# Patient Record
Sex: FEMALE | Race: BLACK OR AFRICAN AMERICAN | Employment: FULL TIME | URBAN - METROPOLITAN AREA
[De-identification: names, ages, dates, MRNs, and addresses within clinical notes are randomized per-mention and may not be internally consistent; named-entity substitution may affect disease eponyms.]

---

## 2018-10-08 PROBLEM — R13.14 PHARYNGOESOPHAGEAL DYSPHAGIA: Chronic | Status: ACTIVE | Noted: 2018-10-08

## 2018-10-08 PROBLEM — K92.1 BLOOD IN STOOL: Status: ACTIVE | Noted: 2018-10-08

## 2018-11-07 ENCOUNTER — OFFICE VISIT (OUTPATIENT)
Dept: ONCOLOGY | Age: 53
End: 2018-11-07

## 2018-11-07 ENCOUNTER — HOSPITAL ENCOUNTER (OUTPATIENT)
Dept: ONCOLOGY | Age: 53
Discharge: HOME | End: 2018-11-07

## 2018-11-07 VITALS
RESPIRATION RATE: 20 BRPM | TEMPERATURE: 98.1 F | OXYGEN SATURATION: 100 % | HEART RATE: 81 BPM | HEIGHT: 66 IN | DIASTOLIC BLOOD PRESSURE: 72 MMHG | BODY MASS INDEX: 23.63 KG/M2 | SYSTOLIC BLOOD PRESSURE: 111 MMHG | WEIGHT: 147 LBS

## 2018-11-07 DIAGNOSIS — D50.8 IRON DEFICIENCY ANEMIA DUE TO DIETARY CAUSES: ICD-10-CM

## 2018-11-07 DIAGNOSIS — D50.8 IRON DEFICIENCY ANEMIA DUE TO DIETARY CAUSES: Primary | ICD-10-CM

## 2018-11-07 PROBLEM — N92.1 METRORRHAGIA: Status: ACTIVE | Noted: 2018-11-07

## 2018-11-07 LAB
BASO+EOS+MONOS # BLD AUTO: 0.4 K/UL (ref 0–2.3)
BASO+EOS+MONOS # BLD AUTO: 8 % (ref 0.1–17)
DIFFERENTIAL METHOD BLD: ABNORMAL
ERYTHROCYTE [DISTWIDTH] IN BLOOD BY AUTOMATED COUNT: 12.7 % (ref 11.5–14.5)
HCT VFR BLD AUTO: 32.9 % (ref 36–48)
HGB BLD-MCNC: 10.9 G/DL (ref 12–16)
LYMPHOCYTES # BLD: 2.7 K/UL (ref 1.1–5.9)
LYMPHOCYTES NFR BLD: 49 % (ref 14–44)
MCH RBC QN AUTO: 31.5 PG (ref 25–35)
MCHC RBC AUTO-ENTMCNC: 33.1 G/DL (ref 31–37)
MCV RBC AUTO: 95.1 FL (ref 78–102)
NEUTS SEG # BLD: 2.3 K/UL (ref 1.8–9.5)
NEUTS SEG NFR BLD: 43 % (ref 40–70)
PLATELET # BLD AUTO: 270 K/UL (ref 140–440)
RBC # BLD AUTO: 3.46 M/UL (ref 4.1–5.1)
WBC # BLD AUTO: 5.4 K/UL (ref 4.5–13)

## 2018-11-07 RX ORDER — MULTIVITAMIN
CAPSULE ORAL
Status: ON HOLD | COMMUNITY
End: 2019-10-21

## 2018-11-07 RX ORDER — ENOXAPARIN SODIUM 100 MG/ML
INJECTION SUBCUTANEOUS
Status: ON HOLD | COMMUNITY
End: 2019-10-21

## 2018-11-07 RX ORDER — METRONIDAZOLE 500 MG/1
TABLET ORAL
Status: ON HOLD | COMMUNITY
End: 2019-10-21

## 2018-11-07 RX ORDER — IBUPROFEN 400 MG/1
400 TABLET ORAL
Status: ON HOLD | COMMUNITY
End: 2019-10-21

## 2018-11-07 RX ORDER — ONDANSETRON 4 MG/1
TABLET, ORALLY DISINTEGRATING ORAL
Status: ON HOLD | COMMUNITY
Start: 2016-03-04 | End: 2019-10-21

## 2018-11-07 RX ORDER — SERTRALINE HYDROCHLORIDE 50 MG/1
TABLET, FILM COATED ORAL
Status: ON HOLD | COMMUNITY
End: 2019-10-21

## 2018-11-07 RX ORDER — AMITRIPTYLINE HYDROCHLORIDE 25 MG/1
TABLET, FILM COATED ORAL
Status: ON HOLD | COMMUNITY
End: 2019-10-21

## 2018-11-07 RX ORDER — BISACODYL 5 MG
TABLET, DELAYED RELEASE (ENTERIC COATED) ORAL
Refills: 0 | Status: ON HOLD | COMMUNITY
Start: 2018-10-03 | End: 2019-10-21

## 2018-11-07 RX ORDER — VENLAFAXINE 75 MG/1
TABLET ORAL
Refills: 5 | Status: ON HOLD | COMMUNITY
Start: 2018-10-23 | End: 2019-10-21

## 2018-11-07 RX ORDER — LOPERAMIDE HYDROCHLORIDE 2 MG/1
CAPSULE ORAL
Status: ON HOLD | COMMUNITY
End: 2019-10-21

## 2018-11-07 RX ORDER — CEFUROXIME AXETIL 250 MG/1
TABLET ORAL
Status: ON HOLD | COMMUNITY
End: 2019-10-21

## 2018-11-07 RX ORDER — POLYETHYLENE GLYCOL-3350 AND ELECTROLYTES 236; 6.74; 5.86; 2.97; 22.74 G/274.31G; G/274.31G; G/274.31G; G/274.31G; G/274.31G
POWDER, FOR SOLUTION ORAL
Refills: 0 | Status: ON HOLD | COMMUNITY
Start: 2018-10-03 | End: 2019-10-21

## 2018-11-07 RX ORDER — WARFARIN 2.5 MG/1
TABLET ORAL
Status: ON HOLD | COMMUNITY
End: 2019-10-21

## 2018-11-07 RX ORDER — POLYETHYLENE GLYCOL 3350, SODIUM CHLORIDE, POTASSIUM CHLORIDE, SODIUM BICARBONATE, AND SODIUM SULFATE 240; 5.84; 2.98; 6.72; 22.72 G/4L; G/4L; G/4L; G/4L; G/4L
POWDER, FOR SOLUTION ORAL
Status: ON HOLD | COMMUNITY
End: 2019-10-21

## 2018-11-07 RX ORDER — AZITHROMYCIN 250 MG/1
TABLET, FILM COATED ORAL
Refills: 0 | Status: ON HOLD | COMMUNITY
Start: 2018-10-23 | End: 2019-10-21

## 2018-11-07 RX ORDER — NITROFURANTOIN 25; 75 MG/1; MG/1
CAPSULE ORAL
Status: ON HOLD | COMMUNITY
End: 2019-10-21

## 2018-11-07 RX ORDER — AMOXICILLIN 875 MG/1
TABLET, FILM COATED ORAL
Status: ON HOLD | COMMUNITY
End: 2019-10-21

## 2018-11-07 RX ORDER — DABIGATRAN ETEXILATE 150 MG/1
CAPSULE ORAL
Status: ON HOLD | COMMUNITY
End: 2019-10-21

## 2018-11-07 RX ORDER — PREDNISONE 10 MG/1
TABLET ORAL
Status: ON HOLD | COMMUNITY
End: 2019-10-21

## 2018-11-07 RX ORDER — SERTRALINE HYDROCHLORIDE 50 MG/1
TABLET, FILM COATED ORAL
COMMUNITY
Start: 2018-11-02 | End: 2021-12-16

## 2018-11-07 RX ORDER — CHOLECALCIFEROL (VITAMIN D3) 50 MCG
CAPSULE ORAL
Status: ON HOLD | COMMUNITY
End: 2019-10-21

## 2018-11-07 RX ORDER — HYDROCODONE BITARTRATE AND IBUPROFEN 7.5; 2 MG/1; MG/1
TABLET, FILM COATED ORAL
Status: ON HOLD | COMMUNITY
End: 2019-10-21

## 2018-11-07 RX ORDER — IBUPROFEN AND FAMOTIDINE 26.6; 8 MG/1; MG/1
TABLET, FILM COATED ORAL
Status: ON HOLD | COMMUNITY
End: 2019-10-21

## 2018-11-07 RX ORDER — GENTAMICIN SULFATE 1 MG/G
CREAM TOPICAL
Status: ON HOLD | COMMUNITY
End: 2019-10-21

## 2018-11-07 RX ORDER — CIPROFLOXACIN HYDROCHLORIDE 3.5 MG/ML
SOLUTION/ DROPS TOPICAL
Status: ON HOLD | COMMUNITY
End: 2019-10-21

## 2018-11-07 RX ORDER — TOPIRAMATE 25 MG/1
TABLET ORAL
Status: ON HOLD | COMMUNITY
End: 2019-10-21

## 2018-11-07 RX ORDER — CEPHALEXIN 500 MG/1
CAPSULE ORAL
Status: ON HOLD | COMMUNITY
End: 2019-10-21

## 2018-11-07 RX ORDER — LOPERAMIDE HCL 2 MG
2 TABLET ORAL
Status: ON HOLD | COMMUNITY
Start: 2016-03-04 | End: 2019-10-21

## 2018-11-07 RX ORDER — ALBUTEROL SULFATE 90 UG/1
1-2 AEROSOL, METERED RESPIRATORY (INHALATION)
COMMUNITY
Start: 2016-03-04 | End: 2021-12-16

## 2018-11-07 RX ORDER — OXYCODONE AND ACETAMINOPHEN 10; 325 MG/1; MG/1
TABLET ORAL
Status: ON HOLD | COMMUNITY
End: 2019-10-21

## 2018-11-07 RX ORDER — GABAPENTIN 300 MG/1
CAPSULE ORAL
Status: ON HOLD | COMMUNITY
End: 2019-10-21

## 2018-11-07 RX ORDER — CIPROFLOXACIN 250 MG/1
TABLET, FILM COATED ORAL
Status: ON HOLD | COMMUNITY
End: 2019-10-21

## 2018-11-07 RX ORDER — HYOSCYAMINE SULFATE 0.12 MG/1
TABLET SUBLINGUAL
Status: ON HOLD | COMMUNITY
End: 2019-10-21

## 2018-11-07 RX ORDER — WARFARIN SODIUM 5 MG/1
TABLET ORAL
Status: ON HOLD | COMMUNITY
End: 2019-10-21

## 2018-11-07 NOTE — PATIENT INSTRUCTIONS
Iron Deficiency Anemia: Care Instructions  Your Care Instructions    Anemia means that you do not have enough red blood cells. Red blood cells carry oxygen around your body. When you have anemia, it can make you pale, weak, and tired. Many things can cause anemia. The most common cause is loss of blood. This can happen if you have heavy menstrual periods. It can also happen if you have bleeding in your stomach or bowel. You can also get anemia if you don't have enough iron in your diet or if it's hard for your body to absorb iron. In some cases, pregnancy causes anemia. That's because a pregnant woman needs more iron. Your doctor may do more tests to find the cause of your anemia. If a disease or other health problem is causing it, your doctor will treat that problem. It's important to follow up with your doctor to make sure that your iron level returns to normal.  Follow-up care is a key part of your treatment and safety. Be sure to make and go to all appointments, and call your doctor if you are having problems. It's also a good idea to know your test results and keep a list of the medicines you take. How can you care for yourself at home? · If your doctor recommended iron pills, take them as directed. ? Try to take the pills on an empty stomach. You can do this about 1 hour before or 2 hours after meals. But you may need to take iron with food to avoid an upset stomach. ? Do not take antacids or drink milk or anything with caffeine within 2 hours of when you take your iron. They can keep your body from absorbing the iron well. ? Vitamin C helps your body absorb iron. You may want to take iron pills with a glass of orange juice or some other food high in vitamin C.  ? Iron pills may cause stomach problems. These include heartburn, nausea, diarrhea, constipation, and cramps. It can help to drink plenty of fluids and include fruits, vegetables, and fiber in your diet.   ? It's normal for iron pills to make your stool a greenish or grayish black. But internal bleeding can also cause dark stool. So it's important to tell your doctor about any color changes. ? Call your doctor if you think you are having a problem with your iron pills. Even after you start to feel better, it will take several months for your body to build up its supply of iron. ? If you miss a pill, don't take a double dose. ? Keep iron pills out of the reach of small children. Too much iron can be very dangerous. · Eat foods with a lot of iron. These include red meat, shellfish, poultry, and eggs. They also include beans, raisins, whole-grain bread, and leafy green vegetables. · Steam your vegetables. This is the best way to prepare them if you want to get as much iron as possible. · Be safe with medicines. Do not take nonsteroidal anti-inflammatory pain relievers unless your doctor tells you to. These include aspirin, naproxen (Aleve), and ibuprofen (Advil, Motrin). · Liquid iron can stain your teeth. But you can mix it with water or juice and drink it with a straw. Then it won't get on your teeth. When should you call for help? Call 911 anytime you think you may need emergency care. For example, call if:    · You passed out (lost consciousness).    Call your doctor now or seek immediate medical care if:    · You are short of breath.     · You are dizzy or light-headed, or you feel like you may faint.     · You have new or worse bleeding.    Watch closely for changes in your health, and be sure to contact your doctor if:    · You feel weaker or more tired than usual.     · You do not get better as expected. Where can you learn more? Go to http://sugey-adeline.info/. Enter M893 in the search box to learn more about \"Iron Deficiency Anemia: Care Instructions. \"  Current as of: May 7, 2018  Content Version: 11.8  © 3016-7121 Healthwise, Incorporated.  Care instructions adapted under license by [employer] (which disclaims liability or warranty for this information). If you have questions about a medical condition or this instruction, always ask your healthcare professional. [address] any warranty or liability for your use of this information.

## 2018-11-07 NOTE — PROGRESS NOTES
Hematology/Oncology Consultation Note    Name: Joaquin Rosales  Date: 2018  : 1965    PCP: Juan Antonio Cross MD       Ms. Rosetta Sever  is a 48 y.o. -American woman who is referred for evaluation of anemia. Subjective:     Chief complaint: Anemia    History of present illness:  Ms. Rosetta Sever is a 59-year-old -American woman who states that she is in reasonably good health. However recent lab analysis from 10/23/2018 revealed that she has a mild degree of anemia with a hemoglobin of 11 g/dL hematocrit of 35%. RBC count was low at 3.56. However her MCV was elevated at 98 fmol/L. Her thyroid function appeared normal.  The vitamin B12 level was 481 pg/mL and the folate was 10 ng/mL. The patient is complaining of a mild degree of fatigue and some weakness but otherwise has no additional complaints. And iron profile showed that her iron level was 67 mcg/dL with an iron saturation of 22%. A ferritin level is not available for review. She denies having any noticeable gastrointestinal genitourinary blood loss. Past Medical History:   Diagnosis Date    Anemia NEC     Avulsion fracture of ankle     right lat malleolus    Diffuse cystic mastopathy     Herpes progenitalis     Thromboembolus ([facility] [address] 14    right leg    Vertigo 9-4-14.         Allergies   Allergen Reactions    Sulfa (Sulfonamide Antibiotics) Rash and Other (comments)     Intolerance       [place] Swelling    Lovenox [Enoxaparin] Other (comments)     Increased liver function       Past Surgical History:   Procedure Laterality Date    DELIVERY       DELIVERY   ,    HX GYN      uterine ablation    HX ORTHOPAEDIC      carpal tunnel of left wrist    HX SEPTOPLASTY         Social History     Socioeconomic History    Marital status:      Spouse name: Not on file    Number of children: Not on file    Years of education: Not on file    Highest education level: Not on file   Social Needs    Financial resource strain: Not on file    Food insecurity - worry: Not on file    Food insecurity - inability: Not on file    Transportation needs - medical: Not on file   Sifteo needs - non-medical: Not on file   Occupational History    Occupation: Mortician   Tobacco Use    Smoking status: Never Smoker    Smokeless tobacco: Never Used   Substance and Sexual Activity    Alcohol use: No    Drug use: No    Sexual activity: Yes     Partners: Male   Other Topics Concern    Not on file   Social History Narrative    Not on file       Family History   Problem Relation Age of Onset    High Cholesterol Mother     Thyroid Disease Mother        Current Outpatient Medications   Medication Sig Dispense Refill    albuterol (PROVENTIL HFA, VENTOLIN HFA, PROAIR HFA) 90 mcg/actuation inhaler 1-2 Puffs.  loperamide (IMMODIUM) 2 mg tablet 2 mg.       ondansetron (ZOFRAN ODT) 4 mg disintegrating tablet ondansetron 4 mg disintegrating tablet      multivitamin capsule multivitamin      Omeprazole Magnesium 20 mg cpDR omeprazole 20 mg capsule,delayed release      amitriptyline (ELAVIL) 25 mg tablet amitriptyline 25 mg tablet      amoxicillin (AMOXIL) 875 mg tablet amoxicillin 875 mg tablet      azithromycin (ZITHROMAX) 250 mg tablet USE AS DIRECTED  0    bisacodyl (DULCOLAX) 5 mg EC tablet TAKE 2 TABS 1 HR PRIOR TO EACH COLON PREP ORALLY 2 DAYS  0    cefUROXime (CEFTIN) 250 mg tablet cefuroxime axetil 250 mg tablet      cephALEXin (KEFLEX) 500 mg capsule cephalexin 500 mg capsule      ciprofloxacin HCl (CILOXAN) 0.3 % ophthalmic solution ciprofloxacin 0.3 % eye drops      ciprofloxacin HCl (CIPRO) 250 mg tablet ciprofloxacin 250 mg tablet      dabigatran etexilate (PRADAXA) 150 mg capsule Pradaxa 150 mg capsule      enoxaparin (LOVENOX) 60 mg/0.6 mL injection enoxaparin 60 mg/0.6 mL subcutaneous syringe      gabapentin (NEURONTIN) 300 mg capsule gabapentin 300 mg capsule      gentamicin (GARAMYCIN) 0.1 % topical cream gentamicin 0.1 % topical cream      HYDROcodone-ibuprofen (VICOPROFEN) 7.5-200 mg per tablet hydrocodone 7.5 mg-ibuprofen 200 mg tablet      hyoscyamine SL (LEVSIN/SL) 0.125 mg SL tablet hyoscyamine 0.125 mg sublingual tablet      ibuprofen (MOTRIN) 400 mg tablet 400 mg.  loperamide (IMODIUM) 2 mg capsule loperamide 2 mg capsule      metroNIDAZOLE (FLAGYL) 500 mg tablet metronidazole 500 mg tablet      nitrofurantoin, macrocrystal-monohydrate, (MACROBID) 100 mg capsule nitrofurantoin monohydrate/macrocrystals 100 mg capsule      oxyCODONE-acetaminophen (PERCOCET 10)  mg per tablet oxycodone-acetaminophen 10 mg-325 mg tablet      PEG 3350-Electrolytes (GAVILYTE-C) 240-22.72-6.72 -5.84 gram solution Gavilyte-C 240 gram-22.72 gram-6.72 gram-5.84 gram oral solution      GAVILYTE-G 236-22.74-6.74 -5.86 gram suspension TAKE AS DIRECTED FOR 2 DAYS  0    predniSONE (DELTASONE) 10 mg tablet prednisone 10 mg tablet      sertraline (ZOLOFT) 50 mg tablet sertraline 50 mg tablet   TAKE 2 TABLETS BY MOUTH EVERY DAY      sertraline (ZOLOFT) 50 mg tablet       topiramate (TOPAMAX) 25 mg tablet topiramate 25 mg tablet      venlafaxine (EFFEXOR) 75 mg tablet TAKE 1 TABLET BY MOUTH EVERY DAY  5    warfarin (COUMADIN) 2.5 mg tablet warfarin 2.5 mg tablet      warfarin (COUMADIN) 5 mg tablet warfarin 5 mg tablet      ibuprofen-famotidine 800-26.6 mg tab ibuprofen 800 mg tablet      ascorbic acid (VITAMIN C) 1,000 mg tablet Take 1 Tab by mouth daily. 30 Tab 5    MULTIVITAMINS,THERAPEUTIC (THERAGRAN PO) Take 1 Tab by mouth daily. Review of Systems    General ROS:The patient has complaints of a mild degree of fatigue and some weakness. Psychological ROS: patient denies having any psychological symptoms such as hallucinations, depression or anxiety. Ophthalmic ROS:the patient denies having any visual impairment or eye discomfort.   ENT ROS: there are no abnormalities reported. Allergy and Immunology ROS:the patient denies having any seasonal allergies or allergies to medications other than those already outlined above. Hematological and Lymphatic ROS: the patient denies having any bruising, bleeding or lymphadenopathy. Endocrine ROS: the patient denies having any heat or cold intolerance. There is no history of diabetes or thyroid disorders. Breast ROS: the patient denies having any history of breast mass, nipple discharge, or lumps. Respiratory ROS:the patient denies having any cough, shortness of breath, or dyspnea on exertion. Cardiovascular ROS: there are no complaints of chest pain, palpitations, chest pounding, or dyspnea on exertion. Gastrointestinal ROS: the patient denies having nausea, emesis, diarrhea, constipation, or blood in the stool. Genito-Urinary ROS: the patient denies having urinary urgency, frequency, or dysuria. Musculoskeletal ROS: with the exception of mild arthralgias the patient has no other musculoskeletal complaints. Neurological ROS: the patient denies having any numbness, tingling, or neurologic deficits. Dermatological ROS:patient denies having any unexplained rash, skin ulcerations, or hives. Objective:     Visit Vitals  /72 (BP 1 Location: Right arm, BP Patient Position: Sitting)   Pulse 81   Temp 98.1 °F (36.7 °C) (Oral)   Resp 20   Ht 5' 6\" (1.676 m)   Wt 66.7 kg (147 lb)   SpO2 100%   BMI 23.73 kg/m²        ECOGPS=0; pain score=0/10    Physical Exam:   Gen. Appearance: the patient is in no acute distress. Skin: There is no evidence of bruise or rash. HEENT: The head is normocephalic and atraumatic. The conjunctiva and sclera are clear. Pupils are equal, round, reactive to light, and accommodation. The extraocular movements are intact. ENT reveals no oral mucosal lesions or ulcerations. Neck: Supple without lymphadenopathy or thyromegaly.   Lungs: Clear to auscultation and percussion; there are no wheezes or rhonchi. Heart: Regular rate and rhythm; there are no murmurs, gallops, or rubs. Abdomen: Bowel sounds are present and normal.  There is no guarding, tenderness, or hepatosplenomegaly. Extremities: There is no clubbing, cyanosis, or edema. Neurologic: There are no focal neurologic deficits. Lymphatics: There is no palpable peripheral lymphadenopathy. Lab data:    A CBC dated 10/23/2018 shows WBC count of 5.6, RBC 3.56, hemoglobin 11 g/dL, hematocrit 35%, MCV 98, and the platelet count was 060,397. And iron profile showed an iron level of 67 mcg/dL and iron saturation of 22%. The vitamin B12 level was 481 pg/mL and the folate was 10.09 ng/mL. A comprehensive metabolic panel revealed all normal values except for a slightly elevated glucose of 110 mg/dL. Assessment:   Chronic anemia: Have explained to the patient that she apparently has a functional iron deficiency. Unfortunately I do not have a ferritin level to gauge whether or not she has a true iron deficiency per se. The iron profile revealed a circulating serum iron of 67 mcg/dL with an iron saturation of 22%. Diagnostic considerations at this time will include slowly progressive functional iron deficiency or slowly evolving plasma cell dyscrasia. Based on her age myelodysplastic syndrome is less likely. Plan:   Chronic anemia: At this time I will order a competency metabolic panel, ferritin level, and comprehensive metabolic panel. Additionally the SPEP will be ordered to have rule out any evidence of a slowly evolving plasma cell dyscrasia. If the only finding is a functional iron deficit then she will be offered iron supplementation depending on the degree of ferritin deficit. Follow-up in 2 weeks to review lab data and to discuss potential options of management.     Orders Placed This Encounter    METABOLIC PANEL, COMPREHENSIVE     Standing Status:   Future     Number of Occurrences:   1     Standing Expiration Date:   2019    FERRITIN     Standing Status:   Future     Number of Occurrences:   1     Standing Expiration Date:   2019    IRON PROFILE     Standing Status:   Future     Number of Occurrences:   1     Standing Expiration Date:   2019    VITAMIN B12 & FOLATE     Standing Status:   Future     Number of Occurrences:   1     Standing Expiration Date:   2019    SPEP     Standing Status:   Future     Number of Occurrences:   1     Standing Expiration Date:   2019    multivitamin capsule     Sig: multivitamin    Omeprazole Magnesium 20 mg cpDR     Sig: omeprazole 20 mg capsule,delayed release    albuterol (PROVENTIL HFA, VENTOLIN HFA, PROAIR HFA) 90 mcg/actuation inhaler     Si-2 Puffs.     amitriptyline (ELAVIL) 25 mg tablet     Sig: amitriptyline 25 mg tablet    amoxicillin (AMOXIL) 875 mg tablet     Sig: amoxicillin 875 mg tablet    azithromycin (ZITHROMAX) 250 mg tablet     Sig: USE AS DIRECTED     Refill:  0    bisacodyl (DULCOLAX) 5 mg EC tablet     Sig: TAKE 2 TABS 1 HR PRIOR TO EACH COLON PREP ORALLY 2 DAYS     Refill:  0    cefUROXime (CEFTIN) 250 mg tablet     Sig: cefuroxime axetil 250 mg tablet    cephALEXin (KEFLEX) 500 mg capsule     Sig: cephalexin 500 mg capsule    ciprofloxacin HCl (CILOXAN) 0.3 % ophthalmic solution     Sig: ciprofloxacin 0.3 % eye drops    ciprofloxacin HCl (CIPRO) 250 mg tablet     Sig: ciprofloxacin 250 mg tablet    dabigatran etexilate (PRADAXA) 150 mg capsule     Sig: Pradaxa 150 mg capsule    enoxaparin (LOVENOX) 60 mg/0.6 mL injection     Sig: enoxaparin 60 mg/0.6 mL subcutaneous syringe    gabapentin (NEURONTIN) 300 mg capsule     Sig: gabapentin 300 mg capsule    gentamicin (GARAMYCIN) 0.1 % topical cream     Sig: gentamicin 0.1 % topical cream    HYDROcodone-ibuprofen (VICOPROFEN) 7.5-200 mg per tablet     Sig: hydrocodone 7.5 mg-ibuprofen 200 mg tablet    hyoscyamine SL (LEVSIN/SL) 0.125 mg SL tablet     Sig: hyoscyamine 0.125 mg sublingual tablet    ibuprofen (MOTRIN) 400 mg tablet     Si mg.  loperamide (IMODIUM) 2 mg capsule     Sig: loperamide 2 mg capsule    loperamide (IMMODIUM) 2 mg tablet     Si mg.  metroNIDAZOLE (FLAGYL) 500 mg tablet     Sig: metronidazole 500 mg tablet    nitrofurantoin, macrocrystal-monohydrate, (MACROBID) 100 mg capsule     Sig: nitrofurantoin monohydrate/macrocrystals 100 mg capsule    ondansetron (ZOFRAN ODT) 4 mg disintegrating tablet     Sig: ondansetron 4 mg disintegrating tablet    oxyCODONE-acetaminophen (PERCOCET 10)  mg per tablet     Sig: oxycodone-acetaminophen 10 mg-325 mg tablet    PEG 3350-Electrolytes (GAVILYTE-C) 240-22.72-6.72 -5.84 gram solution     Sig: Gavilyte-C 240 gram-22.72 gram-6.72 gram-5.84 gram oral solution    GAVILYTE-G 236-22.74-6.74 -5.86 gram suspension     Sig: TAKE AS DIRECTED FOR 2 DAYS     Refill:  0    predniSONE (DELTASONE) 10 mg tablet     Sig: prednisone 10 mg tablet    sertraline (ZOLOFT) 50 mg tablet     Sig: sertraline 50 mg tablet   TAKE 2 TABLETS BY MOUTH EVERY DAY    sertraline (ZOLOFT) 50 mg tablet    topiramate (TOPAMAX) 25 mg tablet     Sig: topiramate 25 mg tablet    venlafaxine (EFFEXOR) 75 mg tablet     Sig: TAKE 1 TABLET BY MOUTH EVERY DAY     Refill:  5    warfarin (COUMADIN) 2.5 mg tablet     Sig: warfarin 2.5 mg tablet    warfarin (COUMADIN) 5 mg tablet     Sig: warfarin 5 mg tablet    ibuprofen-famotidine 800-26.6 mg tab     Sig: ibuprofen 800 mg tablet           Jose Young MD  2018      Please note: This document has been produced using voice recognition software. Unrecognized errors in transcription may be present.

## 2018-11-08 LAB
A-G RATIO,AGRAT: 2 RATIO (ref 1.1–2.6)
ALBUMIN SERPL-MCNC: 4.8 G/DL (ref 3.5–5)
ALBUMIN, 001488: 58.4 % (ref 46.6–62.6)
ALP SERPL-CCNC: 93 U/L (ref 25–115)
ALPHA-1-GLOBULIN, 001489: 3.2 % (ref 1.7–4.1)
ALPHA-2-GLOBULIN, 001490: 10.6 % (ref 5.9–13.5)
ALT SERPL-CCNC: 15 U/L (ref 5–40)
ANION GAP SERPL CALC-SCNC: 15 MMOL/L
AST SERPL W P-5'-P-CCNC: 15 U/L (ref 10–37)
BETA GLOBULIN, 001491: 16.6 % (ref 10.9–18.9)
BILIRUB SERPL-MCNC: 0.2 MG/DL (ref 0.2–1.2)
BUN SERPL-MCNC: 17 MG/DL (ref 6–22)
CALCIUM SERPL-MCNC: 10 MG/DL (ref 8.4–10.5)
CHLORIDE SERPL-SCNC: 103 MMOL/L (ref 98–110)
CO2 SERPL-SCNC: 23 MMOL/L (ref 20–32)
CREAT SERPL-MCNC: 0.7 MG/DL (ref 0.5–1.2)
FE % SATURATION,PSAT: 25 % (ref 20–50)
FERRITIN SERPL-MCNC: 68 NG/ML (ref 10–291)
FOLATE,FOL: 8.38 NG/ML
GAMMA GLOBULIN, 001492: 11.2 % (ref 11.6–24.4)
GFRAA, 66117: >60
GFRNA, 66118: >60
GLOBULIN,GLOB: 2.4 G/DL (ref 2–4)
GLUCOSE SERPL-MCNC: 85 MG/DL (ref 70–99)
IRON,IRN: 77 MCG/DL (ref 30–160)
PE INTERPRETATION, 107352: ABNORMAL
POTASSIUM SERPL-SCNC: 4 MMOL/L (ref 3.5–5.5)
PROT SERPL-MCNC: 6.8 G/DL (ref 6.4–8.3)
PROT SERPL-MCNC: 7.2 G/DL (ref 6.4–8.3)
SODIUM SERPL-SCNC: 141 MMOL/L (ref 133–145)
TIBC,TIBC: 310 MCG/DL (ref 228–428)
UIBC SERPL-MCNC: 233 MCG/DL (ref 110–370)
VIT B12 SERPL-MCNC: 481 PG/ML (ref 211–911)

## 2018-11-29 ENCOUNTER — OFFICE VISIT (OUTPATIENT)
Dept: ONCOLOGY | Age: 53
End: 2018-11-29

## 2018-11-29 VITALS
HEIGHT: 66 IN | RESPIRATION RATE: 16 BRPM | BODY MASS INDEX: 24.75 KG/M2 | HEART RATE: 96 BPM | OXYGEN SATURATION: 100 % | TEMPERATURE: 97.1 F | SYSTOLIC BLOOD PRESSURE: 94 MMHG | WEIGHT: 154 LBS | DIASTOLIC BLOOD PRESSURE: 50 MMHG

## 2018-11-29 DIAGNOSIS — N92.1 METRORRHAGIA: Primary | ICD-10-CM

## 2018-11-29 NOTE — PATIENT INSTRUCTIONS
Anemia From Chronic Disease: Care Instructions Your Care Instructions Anemia is a low level of red blood cells. Red blood cells carry oxygen from your lungs to the rest of your body. Sometimes when you have a long-term (chronic) disease, such as kidney disease, arthritis, diabetes, cancer, or an infection, your body does not make enough red blood cells. Follow-up care is a key part of your treatment and safety. Be sure to make and go to all appointments, and call your doctor if you are having problems. It's also a good idea to know your test results and keep a list of the medicines you take. How can you care for yourself at home? · Follow your doctor's instructions to treat the chronic condition that is causing the anemia. · Be safe with medicines. Take your medicine to treat your chronic condition exactly as prescribed. Call your doctor if you think you are having a problem with your medicine. · Take your medicine for anemia exactly as prescribed. Call your doctor if you think you are having a problem with your medicine. Medicines to increase the number of red blood cells (such as epoetin or darbepoetin) may be given as an injection. ? If you miss a dose, take it as soon as you can, unless it is almost time for your next dose. In that case, get back on your regular schedule and take only one dose. ? Do not freeze this medicine. Store it in the refrigerator. Do not shake the bottle before you prepare the shot. · Keep all your appointments for blood tests to check on your hemoglobin levels. When should you call for help? Call 911 anytime you think you may need emergency care. For example, call if: 
  · You passed out (lost consciousness).  
 Call your doctor now or seek immediate medical care if: 
  · You are short of breath.  
  · You are dizzy or light-headed, or you feel like you may faint.  
  · You have new or worse bleeding.  Watch closely for changes in your health, and be sure to contact your doctor if: 
  · You feel weaker or more tired than usual.  
  · You do not get better as expected. Where can you learn more? Go to http://sugey-adeline.info/. Enter E502 in the search box to learn more about \"Anemia From Chronic Disease: Care Instructions. \" Current as of: May 7, 2018 Content Version: 11.8 © 2860-6977 [employer]wise, Incorporated. Care instructions adapted under license by [employer] (which disclaims liability or warranty for this information). If you have questions about a medical condition or this instruction, always ask your healthcare professional. [address] any warranty or liability for your use of this information.

## 2018-11-29 NOTE — PROGRESS NOTES
Hematology/medical oncology progress note 11/29/2018 Leda Thakkar YOB: 1965 PCP: Dr. Nery Shi Diagnosis: Anemia of multifactorial etiology Ms. Randee Styles is a 59-year-old woman who was referred for an evaluation of anemia. I have explained to the patient that her CBC from 11/7/2018 revealed a WBC count of 5.4, hemoglobin 10.9 g/dL, hematocrit 32.9, and a platelet count of 776,878. A serum protein electrophoresis showed no evidence of a monoclonal paraprotein. Her B12 level was normal at 481 pg/mL. The folate was normal at 8.38 ng/mL. Iron studies revealed an iron level of 77 mcg/dL with an iron saturation of 25%. Ferritin level was 68 ng/mL. The comprehensive metabolic panel from 01/1/4600 showed all normal values. I have explained to the patient that she does not have and her liver function and kidney function are normal.  She does have a mild functional anemia since her ferritin level is below 80 ng/mL. At this time I am recommending that she began taking ferrous sulfate 1 tablet daily. I will recheck her hemoglobin hematocrit again in about 6 weeks. If there is no significant change or if the hemoglobin is continuing to decline she will then need to undergo a bone marrow biopsy to assess for any evidence that she may be developing early myelodysplastic syndrome. The patient had her questions answered to her satisfaction. Total time 30 minutes, greater than 50% of the time was in counseling and coordination of care. Satinder Dove MD, [place]

## 2019-01-10 ENCOUNTER — HOSPITAL ENCOUNTER (OUTPATIENT)
Dept: ONCOLOGY | Age: 54
Discharge: HOME | End: 2019-01-10

## 2019-01-10 ENCOUNTER — OFFICE VISIT (OUTPATIENT)
Dept: ONCOLOGY | Age: 54
End: 2019-01-10

## 2019-01-10 VITALS
SYSTOLIC BLOOD PRESSURE: 111 MMHG | RESPIRATION RATE: 16 BRPM | BODY MASS INDEX: 24.27 KG/M2 | DIASTOLIC BLOOD PRESSURE: 73 MMHG | TEMPERATURE: 97 F | WEIGHT: 151 LBS | OXYGEN SATURATION: 100 % | HEART RATE: 92 BPM | HEIGHT: 66 IN

## 2019-01-10 DIAGNOSIS — D50.8 IRON DEFICIENCY ANEMIA DUE TO DIETARY CAUSES: ICD-10-CM

## 2019-01-10 DIAGNOSIS — D50.8 IRON DEFICIENCY ANEMIA DUE TO DIETARY CAUSES: Primary | ICD-10-CM

## 2019-01-10 LAB
BASO+EOS+MONOS # BLD AUTO: 0.5 K/UL (ref 0–2.3)
BASO+EOS+MONOS NFR BLD AUTO: 10 % (ref 0.1–17)
DIFFERENTIAL METHOD BLD: ABNORMAL
ERYTHROCYTE [DISTWIDTH] IN BLOOD BY AUTOMATED COUNT: 12.4 % (ref 11.5–14.5)
HCT VFR BLD AUTO: 33.8 % (ref 36–48)
HGB BLD-MCNC: 11.2 G/DL (ref 12–16)
LYMPHOCYTES # BLD: 2.1 K/UL (ref 1.1–5.9)
LYMPHOCYTES NFR BLD: 44 % (ref 14–44)
MCH RBC QN AUTO: 31.6 PG (ref 25–35)
MCHC RBC AUTO-ENTMCNC: 33.1 G/DL (ref 31–37)
MCV RBC AUTO: 95.5 FL (ref 78–102)
NEUTS SEG # BLD: 2.3 K/UL (ref 1.8–9.5)
NEUTS SEG NFR BLD: 47 % (ref 40–70)
PLATELET # BLD AUTO: 268 K/UL (ref 140–440)
RBC # BLD AUTO: 3.54 M/UL (ref 4.1–5.1)
WBC # BLD AUTO: 4.9 K/UL (ref 4.5–13)

## 2019-01-10 NOTE — PATIENT INSTRUCTIONS
Iron Deficiency Anemia: Care Instructions Your Care Instructions Anemia means that you do not have enough red blood cells. Red blood cells carry oxygen around your body. When you have anemia, it can make you pale, weak, and tired. Many things can cause anemia. The most common cause is loss of blood. This can happen if you have heavy menstrual periods. It can also happen if you have bleeding in your stomach or bowel. You can also get anemia if you don't have enough iron in your diet or if it's hard for your body to absorb iron. In some cases, pregnancy causes anemia. That's because a pregnant woman needs more iron. Your doctor may do more tests to find the cause of your anemia. If a disease or other health problem is causing it, your doctor will treat that problem. It's important to follow up with your doctor to make sure that your iron level returns to normal. 
Follow-up care is a key part of your treatment and safety. Be sure to make and go to all appointments, and call your doctor if you are having problems. It's also a good idea to know your test results and keep a list of the medicines you take. How can you care for yourself at home? · If your doctor recommended iron pills, take them as directed. ? Try to take the pills on an empty stomach. You can do this about 1 hour before or 2 hours after meals. But you may need to take iron with food to avoid an upset stomach. ? Do not take antacids or drink milk or anything with caffeine within 2 hours of when you take your iron. They can keep your body from absorbing the iron well. ? Vitamin C helps your body absorb iron. You may want to take iron pills with a glass of orange juice or some other food high in vitamin C. 
? Iron pills may cause stomach problems. These include heartburn, nausea, diarrhea, constipation, and cramps. It can help to drink plenty of fluids and include fruits, vegetables, and fiber in your diet. ? It's normal for iron pills to make your stool a greenish or grayish black. But internal bleeding can also cause dark stool. So it's important to tell your doctor about any color changes. ? Call your doctor if you think you are having a problem with your iron pills. Even after you start to feel better, it will take several months for your body to build up its supply of iron. ? If you miss a pill, don't take a double dose. ? Keep iron pills out of the reach of small children. Too much iron can be very dangerous. · Eat foods with a lot of iron. These include red meat, shellfish, poultry, and eggs. They also include beans, raisins, whole-grain bread, and leafy green vegetables. · Steam your vegetables. This is the best way to prepare them if you want to get as much iron as possible. · Be safe with medicines. Do not take nonsteroidal anti-inflammatory pain relievers unless your doctor tells you to. These include aspirin, naproxen (Aleve), and ibuprofen (Advil, Motrin). · Liquid iron can stain your teeth. But you can mix it with water or juice and drink it with a straw. Then it won't get on your teeth. When should you call for help? Call 911 anytime you think you may need emergency care. For example, call if: 
  · You passed out (lost consciousness).  
 Call your doctor now or seek immediate medical care if: 
  · You are short of breath.  
  · You are dizzy or light-headed, or you feel like you may faint.  
  · You have new or worse bleeding.  
 Watch closely for changes in your health, and be sure to contact your doctor if: 
  · You feel weaker or more tired than usual.  
  · You do not get better as expected. Where can you learn more? Go to http://sugey-adeline.info/. Enter I707 in the search box to learn more about \"Iron Deficiency Anemia: Care Instructions. \" Current as of: May 7, 2018 Content Version: 11.8 © 3536-3169 [employer].  Care instructions adapted under license by [address] (which disclaims liability or warranty for this information). If you have questions about a medical condition or this instruction, always ask your healthcare professional. [address] any warranty or liability for your use of this information.

## 2019-01-10 NOTE — PROGRESS NOTES
Hematology/Oncology  Progress Note Name: Alex Willard 
Date: 1/10/2019 : 1965 PCP: Sourav Mcdowell MD  
 
Ms. Molina Reyes is a 48 y.o. -American woman with functional iron deficiency. Current therapy Slow Fe 1 tablet daily. Subjective: Ms. Molina Reyes is a 66-year-old  functional iron deficiency anemia. At her last clinic visit I recommended that she began taking Slow Fe 1 tablet daily. The goal was to slowly raise her hemoglobin and ferritin level. At her last clinic visit her hemoglobin was 10.9 g/dL with hematocrit of 32.9. She is tolerating oral iron therapy reasonably well and has no complaints of constipation or abdominal cramps. Past medical history, family history, and social history: these were reviewed and remains unchanged. Past Medical History:  
Diagnosis Date  Anemia NEC  Avulsion fracture of ankle   
 right lat malleolus  Diffuse cystic mastopathy  Herpes progenitalis  Thromboembolus ([facility] [address] 14  
 right leg  Vertigo 9-4-14. Past Surgical History:  
Procedure Laterality Date  DELIVERY     DELIVERY   8445,0515  HX GYN    
 uterine ablation  HX ORTHOPAEDIC    
 carpal tunnel of left wrist  
 HX SEPTOPLASTY Social History Socioeconomic History  Marital status:  Spouse name: Not on file  Number of children: Not on file  Years of education: Not on file  Highest education level: Not on file Social Needs  Financial resource strain: Not on file  Food insecurity - worry: Not on file  Food insecurity - inability: Not on file  Transportation needs - medical: Not on file  Transportation needs - non-medical: Not on file Occupational History  Occupation: Mortician Tobacco Use  Smoking status: Never Smoker  Smokeless tobacco: Never Used Substance and Sexual Activity  Alcohol use: No  
 Drug use: No  
 Sexual activity: Yes  
 Partners: Male Other Topics Concern  Not on file Social History Narrative  Not on file Family History Problem Relation Age of Onset  High Cholesterol Mother  Thyroid Disease Mother Current Outpatient Medications Medication Sig Dispense Refill  multivitamin capsule multivitamin  Omeprazole Magnesium 20 mg cpDR omeprazole 20 mg capsule,delayed release  albuterol (PROVENTIL HFA, VENTOLIN HFA, PROAIR HFA) 90 mcg/actuation inhaler 1-2 Puffs.  amitriptyline (ELAVIL) 25 mg tablet amitriptyline 25 mg tablet  amoxicillin (AMOXIL) 875 mg tablet amoxicillin 875 mg tablet  azithromycin (ZITHROMAX) 250 mg tablet USE AS DIRECTED  0  
 bisacodyl (DULCOLAX) 5 mg EC tablet TAKE 2 TABS 1 HR PRIOR TO EACH COLON PREP ORALLY 2 DAYS  0  
 cefUROXime (CEFTIN) 250 mg tablet cefuroxime axetil 250 mg tablet  cephALEXin (KEFLEX) 500 mg capsule cephalexin 500 mg capsule  ciprofloxacin HCl (CILOXAN) 0.3 % ophthalmic solution ciprofloxacin 0.3 % eye drops  ciprofloxacin HCl (CIPRO) 250 mg tablet ciprofloxacin 250 mg tablet  dabigatran etexilate (PRADAXA) 150 mg capsule Pradaxa 150 mg capsule  enoxaparin (LOVENOX) 60 mg/0.6 mL injection enoxaparin 60 mg/0.6 mL subcutaneous syringe  gabapentin (NEURONTIN) 300 mg capsule gabapentin 300 mg capsule  gentamicin (GARAMYCIN) 0.1 % topical cream gentamicin 0.1 % topical cream    
 HYDROcodone-ibuprofen (VICOPROFEN) 7.5-200 mg per tablet hydrocodone 7.5 mg-ibuprofen 200 mg tablet  hyoscyamine SL (LEVSIN/SL) 0.125 mg SL tablet hyoscyamine 0.125 mg sublingual tablet  ibuprofen (MOTRIN) 400 mg tablet 400 mg.  loperamide (IMODIUM) 2 mg capsule loperamide 2 mg capsule  loperamide (IMMODIUM) 2 mg tablet 2 mg.  metroNIDAZOLE (FLAGYL) 500 mg tablet metronidazole 500 mg tablet  nitrofurantoin, macrocrystal-monohydrate, (MACROBID) 100 mg capsule nitrofurantoin monohydrate/macrocrystals 100 mg capsule  ondansetron (ZOFRAN ODT) 4 mg disintegrating tablet ondansetron 4 mg disintegrating tablet  oxyCODONE-acetaminophen (PERCOCET 10)  mg per tablet oxycodone-acetaminophen 10 mg-325 mg tablet  PEG 3350-Electrolytes (GAVILYTE-C) 240-22.72-6.72 -5.84 gram solution Gavilyte-C 240 gram-22.72 gram-6.72 gram-5.84 gram oral solution  GAVILYTE-G 236-22.74-6.74 -5.86 gram suspension TAKE AS DIRECTED FOR 2 DAYS  0  
 predniSONE (DELTASONE) 10 mg tablet prednisone 10 mg tablet  sertraline (ZOLOFT) 50 mg tablet sertraline 50 mg tablet TAKE 2 TABLETS BY MOUTH EVERY DAY  sertraline (ZOLOFT) 50 mg tablet  topiramate (TOPAMAX) 25 mg tablet topiramate 25 mg tablet  venlafaxine (EFFEXOR) 75 mg tablet TAKE 1 TABLET BY MOUTH EVERY DAY  5  
 warfarin (COUMADIN) 2.5 mg tablet warfarin 2.5 mg tablet  warfarin (COUMADIN) 5 mg tablet warfarin 5 mg tablet  ibuprofen-famotidine 800-26.6 mg tab ibuprofen 800 mg tablet  ascorbic acid (VITAMIN C) 1,000 mg tablet Take 1 Tab by mouth daily. 30 Tab 5  MULTIVITAMINS,THERAPEUTIC (THERAGRAN PO) Take 1 Tab by mouth daily. Review of Systems Constitutional: The patient has no acute distress or discomfort. HEENT: The patient denies recent head trauma, eye pain, blurred vision,  hearing deficit, oropharyngeal mucosal pain or lesions, and the patient denies throat pain or discomfort. Lymphatics: The patient denies palpable peripheral lymphadenopathy. Hematologic: The patient denies having bruising, bleeding, or progressive fatigue. Respiratory: Patient denies having shortness of breath, cough, sputum production, fever, or dyspnea on exertion. Cardiovascular: The patient denies having leg pain, leg swelling, heart palpitations, chest permit, chest pain, or lightheadedness. The patient denies having dyspnea on exertion. Gastrointestinal: The patient denies having nausea, emesis, or diarrhea. The patient denies having any hematemesis or blood in the stool. Genitourinary: Patient denies having urinary urgency, frequency, or dysuria. The patient denies having blood in the urine. Psychological: The patient denies having symptoms of nervousness, anxiety, depression, or thoughts of harming self. Skin: Patient denies having skin rashes, skin, ulcerations, or unexplained itching or pruritus. Musculoskeletal: The patient denies having pain in the joints or bones. Objective:  
 
Visit Vitals /73 Pulse 92 Temp 97 °F (36.1 °C) (Oral) Resp 16 Ht 5' 6\" (1.676 m) Wt 68.5 kg (151 lb) SpO2 100% BMI 24.37 kg/m² ECOG PS=0; Pain score=0/10 Physical Exam:  
Gen. Appearance: The patient is in no acute distress. Skin: There is no bruise or rash. HEENT: The exam is unremarkable. Neck: Supple without lymphadenopathy or thyromegaly. Lungs: Clear to auscultation and percussion; there are no wheezes or rhonchi. Heart: Regular rate and rhythm; there are no murmurs, gallops, or rubs. Abdomen: Bowel sounds are present and normal.  There is no guarding, tenderness, or hepatosplenomegaly. Extremities: There is no clubbing, cyanosis, or edema. Neurologic: There are no focal neurologic deficits. Lymphatics: There is no palpable peripheral lymphadenopathy. Musculoskeletal: The patient has full range of motion at all joints. There is no evidence of joint deformity or effusions. There is no focal joint tenderness. Psychological/psychiatric: There is no clinical evidence of anxiety, depression, or melancholy. Lab data: 
   
Results for orders placed or performed during the hospital encounter of 01/10/19 CBC WITH 3 PART DIFF     Status: Abnormal  
Result Value Ref Range Status  WBC 4.9 4.5 - 13.0 K/uL Final  
 RBC 3.54 (L) 4.10 - 5.10 M/uL Final  
 HGB 11.2 (L) 12.0 - 16.0 g/dL Final  
 HCT 33.8 (L) 36 - 48 % Final  
 MCV 95.5 78 - 102 FL Final  
 MCH 31.6 25.0 - 35.0 PG Final  
 MCHC 33.1 31 - 37 g/dL Final  
 RDW 12.4 11.5 - 14.5 % Final  
 PLATELET 761 151 - 524 K/uL Final  
 NEUTROPHILS 47 40 - 70 % Final  
 MIXED CELLS 10 0.1 - 17 % Final  
 LYMPHOCYTES 44 14 - 44 % Final  
 ABS. NEUTROPHILS 2.3 1.8 - 9.5 K/UL Final  
 ABS. MIXED CELLS 0.5 0.0 - 2.3 K/uL Final  
 ABS. LYMPHOCYTES 2.1 1.1 - 5.9 K/UL Final  
  Comment: Test performed at [place] [address] Location. Results Reviewed by Medical Director. DF AUTOMATED   Final  
 
 
   
Assessment: 1. Iron deficiency anemia due to dietary causes Plan:  
Functional iron deficiency anemia: Have explained to the patient that the CBC for from today shows some improvement in her hemoglobin to 11.2 g/dL with hematocrit of 33.8%. The WBC is normal at 4.9 and the platelet count is 944,284. I will check a ferritin level again at this time. In addition to continuing the oral iron therapy I recommended that she began taking Centrum Silver 1 tablet daily as well. The patient had her questions answered to her satisfaction. I will see her back in clinic in 8 weeks. Follow-up in 8 weeks Orders Placed This Encounter  COMPLETE CBC & AUTO DIFF WBC  InHouse CBC ([employer]) Standing Status:   Future Number of Occurrences:   1 Standing Expiration Date:   1/17/2019  METABOLIC PANEL, COMPREHENSIVE Standing Status:   Future Number of Occurrences:   1 Standing Expiration Date:   1/11/2020  
 IRON PROFILE Standing Status:   Future Number of Occurrences:   1 Standing Expiration Date:   1/11/2020  FERRITIN Standing Status:   Future Number of Occurrences:   1 Standing Expiration Date:   1/11/2020 Farzana Cosby MD 
1/10/2019 Please note: This document has been produced using voice recognition software. Unrecognized errors in transcription may be present.

## 2019-01-11 LAB
A-G RATIO,AGRAT: 2 RATIO (ref 1.1–2.6)
ALBUMIN SERPL-MCNC: 4.5 G/DL (ref 3.5–5)
ALP SERPL-CCNC: 79 U/L (ref 25–115)
ALT SERPL-CCNC: 12 U/L (ref 5–40)
ANION GAP SERPL CALC-SCNC: 12 MMOL/L
AST SERPL W P-5'-P-CCNC: 13 U/L (ref 10–37)
BILIRUB SERPL-MCNC: 0.3 MG/DL (ref 0.2–1.2)
BUN SERPL-MCNC: 13 MG/DL (ref 6–22)
CALCIUM SERPL-MCNC: 9.6 MG/DL (ref 8.4–10.5)
CHLORIDE SERPL-SCNC: 106 MMOL/L (ref 98–110)
CO2 SERPL-SCNC: 27 MMOL/L (ref 20–32)
CREAT SERPL-MCNC: 0.7 MG/DL (ref 0.5–1.2)
FE % SATURATION,PSAT: 30 % (ref 20–50)
FERRITIN SERPL-MCNC: 69 NG/ML (ref 10–291)
GFRAA, 66117: >60
GFRNA, 66118: >60
GLOBULIN,GLOB: 2.2 G/DL (ref 2–4)
GLUCOSE SERPL-MCNC: 101 MG/DL (ref 70–99)
IRON,IRN: 91 MCG/DL (ref 30–160)
POTASSIUM SERPL-SCNC: 4.2 MMOL/L (ref 3.5–5.5)
PROT SERPL-MCNC: 6.7 G/DL (ref 6.4–8.3)
SODIUM SERPL-SCNC: 145 MMOL/L (ref 133–145)
TIBC,TIBC: 304 MCG/DL (ref 228–428)
UIBC SERPL-MCNC: 213 MCG/DL (ref 110–370)

## 2019-07-03 ENCOUNTER — HOSPITAL ENCOUNTER (OUTPATIENT)
Dept: ONCOLOGY | Age: 54
Discharge: HOME | End: 2019-07-03

## 2019-07-03 ENCOUNTER — OFFICE VISIT (OUTPATIENT)
Dept: ONCOLOGY | Age: 54
End: 2019-07-03

## 2019-07-03 VITALS
OXYGEN SATURATION: 99 % | WEIGHT: 141 LBS | SYSTOLIC BLOOD PRESSURE: 101 MMHG | DIASTOLIC BLOOD PRESSURE: 65 MMHG | TEMPERATURE: 97.4 F | HEART RATE: 73 BPM | HEIGHT: 66 IN | RESPIRATION RATE: 16 BRPM | BODY MASS INDEX: 22.66 KG/M2

## 2019-07-03 DIAGNOSIS — D50.8 IRON DEFICIENCY ANEMIA DUE TO DIETARY CAUSES: ICD-10-CM

## 2019-07-03 DIAGNOSIS — D50.8 IRON DEFICIENCY ANEMIA DUE TO DIETARY CAUSES: Primary | ICD-10-CM

## 2019-07-03 DIAGNOSIS — E53.8 VITAMIN B12 DEFICIENCY: ICD-10-CM

## 2019-07-03 DIAGNOSIS — E55.9 VITAMIN D DEFICIENCY: ICD-10-CM

## 2019-07-03 LAB
BASO+EOS+MONOS # BLD AUTO: 0.3 K/UL (ref 0–2.3)
BASO+EOS+MONOS NFR BLD AUTO: 7 % (ref 0.1–17)
DIFFERENTIAL METHOD BLD: ABNORMAL
ERYTHROCYTE [DISTWIDTH] IN BLOOD BY AUTOMATED COUNT: 12.9 % (ref 11.5–14.5)
HCT VFR BLD AUTO: 33 % (ref 36–48)
HGB BLD-MCNC: 10.7 G/DL (ref 12–16)
LYMPHOCYTES # BLD: 2.1 K/UL (ref 1.1–5.9)
LYMPHOCYTES NFR BLD: 46 % (ref 14–44)
MCH RBC QN AUTO: 30.7 PG (ref 25–35)
MCHC RBC AUTO-ENTMCNC: 32.4 G/DL (ref 31–37)
MCV RBC AUTO: 94.8 FL (ref 78–102)
NEUTS SEG # BLD: 2.2 K/UL (ref 1.8–9.5)
NEUTS SEG NFR BLD: 47 % (ref 40–70)
PLATELET # BLD AUTO: 304 K/UL (ref 140–440)
RBC # BLD AUTO: 3.48 M/UL (ref 4.1–5.1)
WBC # BLD AUTO: 4.6 K/UL (ref 4.5–13)

## 2019-07-03 NOTE — PATIENT INSTRUCTIONS
Iron Deficiency Anemia: Care Instructions Your Care Instructions Anemia means that you do not have enough red blood cells. Red blood cells carry oxygen around your body. When you have anemia, it can make you pale, weak, and tired. Many things can cause anemia. The most common cause is loss of blood. This can happen if you have heavy menstrual periods. It can also happen if you have bleeding in your stomach or bowel. You can also get anemia if you don't have enough iron in your diet or if it's hard for your body to absorb iron. In some cases, pregnancy causes anemia. That's because a pregnant woman needs more iron. Your doctor may do more tests to find the cause of your anemia. If a disease or other health problem is causing it, your doctor will treat that problem. It's important to follow up with your doctor to make sure that your iron level returns to normal. 
Follow-up care is a key part of your treatment and safety. Be sure to make and go to all appointments, and call your doctor if you are having problems. It's also a good idea to know your test results and keep a list of the medicines you take. How can you care for yourself at home? · If your doctor recommended iron pills, take them as directed. ? Try to take the pills on an empty stomach. You can do this about 1 hour before or 2 hours after meals. But you may need to take iron with food to avoid an upset stomach. ? Do not take antacids or drink milk or anything with caffeine within 2 hours of when you take your iron. They can keep your body from absorbing the iron well. ? Vitamin C helps your body absorb iron. You may want to take iron pills with a glass of orange juice or some other food high in vitamin C. 
? Iron pills may cause stomach problems. These include heartburn, nausea, diarrhea, constipation, and cramps. It can help to drink plenty of fluids and include fruits, vegetables, and fiber in your diet. ? It's normal for iron pills to make your stool a greenish or grayish black. But internal bleeding can also cause dark stool. So it's important to tell your doctor about any color changes. ? Call your doctor if you think you are having a problem with your iron pills. Even after you start to feel better, it will take several months for your body to build up its supply of iron. ? If you miss a pill, don't take a double dose. ? Keep iron pills out of the reach of small children. Too much iron can be very dangerous. · Eat foods with a lot of iron. These include red meat, shellfish, poultry, and eggs. They also include beans, raisins, whole-grain bread, and leafy green vegetables. · Steam your vegetables. This is the best way to prepare them if you want to get as much iron as possible. · Be safe with medicines. Do not take nonsteroidal anti-inflammatory pain relievers unless your doctor tells you to. These include aspirin, naproxen (Aleve), and ibuprofen (Advil, Motrin). · Liquid iron can stain your teeth. But you can mix it with water or juice and drink it with a straw. Then it won't get on your teeth. When should you call for help? Call 911 anytime you think you may need emergency care. For example, call if: 
  · You passed out (lost consciousness).  
 Call your doctor now or seek immediate medical care if: 
  · You are short of breath.  
  · You are dizzy or light-headed, or you feel like you may faint.  
  · You have new or worse bleeding.  
 Watch closely for changes in your health, and be sure to contact your doctor if: 
  · You feel weaker or more tired than usual.  
  · You do not get better as expected. Where can you learn more? Go to http://sugey-adeline.info/. Enter D996 in the search box to learn more about \"Iron Deficiency Anemia: Care Instructions. \" Current as of: May 6, 2018 Content Version: 11.9 © 4693-4469 [employer].  Care instructions adapted under license by [address] (which disclaims liability or warranty for this information). If you have questions about a medical condition or this instruction, always ask your healthcare professional. [address] any warranty or liability for your use of this information.

## 2019-07-03 NOTE — PROGRESS NOTES
Hematology/Oncology  Progress Note    Name: Nathaniel Mcbride  Date: 7/3/2019  : 1965    PCP: Claudene Aures, MD     Ms. Calvin Richmond is a 47 y.o. -American woman with functional iron deficiency. Current therapy Slow Fe 1 tablet daily. Subjective:     Ms. Calvin Richmond is a 44-year-old  functional iron deficiency anemia. At her last clinic visit I recommended that she began taking Slow Fe 1 tablet daily. The goal was to slowly raise her hemoglobin and ferritin level. At her last clinic visit her hemoglobin was 10.9 g/dL with hematocrit of 32.9. She is tolerating oral iron therapy reasonably well and has no complaints of constipation or abdominal cramps. The patient reports that she has gained almost 10 pounds since her last clinic visit. Past medical history, family history, and social history: these were reviewed and remains unchanged. Past Medical History:   Diagnosis Date    Anemia NEC     Avulsion fracture of ankle     right lat malleolus    Diffuse cystic mastopathy     Herpes progenitalis     Thromboembolus ([facility] [address] 14    right leg    Vertigo 9-4-14.       Past Surgical History:   Procedure Laterality Date    DELIVERY       DELIVERY   ,    HX GYN      uterine ablation    HX ORTHOPAEDIC      carpal tunnel of left wrist    HX SEPTOPLASTY       Social History     Socioeconomic History    Marital status:      Spouse name: Not on file    Number of children: Not on file    Years of education: Not on file    Highest education level: Not on file   Occupational History    Occupation: Mortician   Social Needs    Financial resource strain: Not on file    Food insecurity:     Worry: Not on file     Inability: Not on file    Transportation needs:     Medical: Not on file     Non-medical: Not on file   Tobacco Use    Smoking status: Never Smoker    Smokeless tobacco: Never Used   Substance and Sexual Activity    Alcohol use: No    Drug use: No    Sexual activity: Yes     Partners: Male   Lifestyle    Physical activity:     Days per week: Not on file     Minutes per session: Not on file    Stress: Not on file   Relationships    Social connections:     Talks on phone: Not on file     Gets together: Not on file     Attends Latter day service: Not on file     Active member of club or organization: Not on file     Attends meetings of clubs or organizations: Not on file     Relationship status: Not on file    Intimate partner violence:     Fear of current or ex partner: Not on file     Emotionally abused: Not on file     Physically abused: Not on file     Forced sexual activity: Not on file   Other Topics Concern    Not on file   Social History Narrative    Not on file     Family History   Problem Relation Age of Onset    High Cholesterol Mother     Thyroid Disease Mother      Current Outpatient Medications   Medication Sig Dispense Refill    multivitamin capsule multivitamin      Omeprazole Magnesium 20 mg cpDR omeprazole 20 mg capsule,delayed release      albuterol (PROVENTIL HFA, VENTOLIN HFA, PROAIR HFA) 90 mcg/actuation inhaler 1-2 Puffs.       amitriptyline (ELAVIL) 25 mg tablet amitriptyline 25 mg tablet      amoxicillin (AMOXIL) 875 mg tablet amoxicillin 875 mg tablet      azithromycin (ZITHROMAX) 250 mg tablet USE AS DIRECTED  0    bisacodyl (DULCOLAX) 5 mg EC tablet TAKE 2 TABS 1 HR PRIOR TO EACH COLON PREP ORALLY 2 DAYS  0    cefUROXime (CEFTIN) 250 mg tablet cefuroxime axetil 250 mg tablet      cephALEXin (KEFLEX) 500 mg capsule cephalexin 500 mg capsule      ciprofloxacin HCl (CILOXAN) 0.3 % ophthalmic solution ciprofloxacin 0.3 % eye drops      ciprofloxacin HCl (CIPRO) 250 mg tablet ciprofloxacin 250 mg tablet      dabigatran etexilate (PRADAXA) 150 mg capsule Pradaxa 150 mg capsule      enoxaparin (LOVENOX) 60 mg/0.6 mL injection enoxaparin 60 mg/0.6 mL subcutaneous syringe      gabapentin (NEURONTIN) 300 mg capsule gabapentin 300 mg capsule      gentamicin (GARAMYCIN) 0.1 % topical cream gentamicin 0.1 % topical cream      HYDROcodone-ibuprofen (VICOPROFEN) 7.5-200 mg per tablet hydrocodone 7.5 mg-ibuprofen 200 mg tablet      hyoscyamine SL (LEVSIN/SL) 0.125 mg SL tablet hyoscyamine 0.125 mg sublingual tablet      ibuprofen (MOTRIN) 400 mg tablet 400 mg.  loperamide (IMODIUM) 2 mg capsule loperamide 2 mg capsule      loperamide (IMMODIUM) 2 mg tablet 2 mg.  metroNIDAZOLE (FLAGYL) 500 mg tablet metronidazole 500 mg tablet      nitrofurantoin, macrocrystal-monohydrate, (MACROBID) 100 mg capsule nitrofurantoin monohydrate/macrocrystals 100 mg capsule      ondansetron (ZOFRAN ODT) 4 mg disintegrating tablet ondansetron 4 mg disintegrating tablet      oxyCODONE-acetaminophen (PERCOCET 10)  mg per tablet oxycodone-acetaminophen 10 mg-325 mg tablet      PEG 3350-Electrolytes (GAVILYTE-C) 240-22.72-6.72 -5.84 gram solution Gavilyte-C 240 gram-22.72 gram-6.72 gram-5.84 gram oral solution      GAVILYTE-G 236-22.74-6.74 -5.86 gram suspension TAKE AS DIRECTED FOR 2 DAYS  0    predniSONE (DELTASONE) 10 mg tablet prednisone 10 mg tablet      sertraline (ZOLOFT) 50 mg tablet sertraline 50 mg tablet   TAKE 2 TABLETS BY MOUTH EVERY DAY      sertraline (ZOLOFT) 50 mg tablet       topiramate (TOPAMAX) 25 mg tablet topiramate 25 mg tablet      venlafaxine (EFFEXOR) 75 mg tablet TAKE 1 TABLET BY MOUTH EVERY DAY  5    warfarin (COUMADIN) 2.5 mg tablet warfarin 2.5 mg tablet      warfarin (COUMADIN) 5 mg tablet warfarin 5 mg tablet      ibuprofen-famotidine 800-26.6 mg tab ibuprofen 800 mg tablet      ascorbic acid (VITAMIN C) 1,000 mg tablet Take 1 Tab by mouth daily. 30 Tab 5    MULTIVITAMINS,THERAPEUTIC (THERAGRAN PO) Take 1 Tab by mouth daily. Review of Systems  Constitutional: The patient has no acute distress or discomfort.   HEENT: The patient denies recent head trauma, eye pain, blurred vision,  hearing deficit, oropharyngeal mucosal pain or lesions, and the patient denies throat pain or discomfort. Lymphatics: The patient denies palpable peripheral lymphadenopathy. Hematologic: The patient denies having bruising, bleeding, or progressive fatigue. Respiratory: Patient denies having shortness of breath, cough, sputum production, fever, or dyspnea on exertion. Cardiovascular: The patient denies having leg pain, leg swelling, heart palpitations, chest permit, chest pain, or lightheadedness. The patient denies having dyspnea on exertion. Gastrointestinal: The patient denies having nausea, emesis, or diarrhea. The patient denies having any hematemesis or blood in the stool. Genitourinary: Patient denies having urinary urgency, frequency, or dysuria. The patient denies having blood in the urine. Psychological: The patient denies having symptoms of nervousness, anxiety, depression, or thoughts of harming self. Skin: Patient denies having skin rashes, skin, ulcerations, or unexplained itching or pruritus. Musculoskeletal: The patient denies having pain in the joints or bones. Objective:     Visit Vitals  /65   Pulse 73   Temp 97.4 °F (36.3 °C) (Oral)   Resp 16   Ht 5' 6\" (1.676 m)   Wt 64 kg (141 lb)   SpO2 99%   BMI 22.76 kg/m²     ECOG PS=0; Pain score=0/10    Physical Exam:   Gen. Appearance: The patient is in no acute distress. Skin: There is no bruise or rash. HEENT: The exam is unremarkable. Neck: Supple without lymphadenopathy or thyromegaly. Lungs: Clear to auscultation and percussion; there are no wheezes or rhonchi. Heart: Regular rate and rhythm; there are no murmurs, gallops, or rubs. Abdomen: Bowel sounds are present and normal.  There is no guarding, tenderness, or hepatosplenomegaly. Extremities: There is no clubbing, cyanosis, or edema. Neurologic: There are no focal neurologic deficits. Lymphatics: There is no palpable peripheral lymphadenopathy. Musculoskeletal: The patient has full range of motion at all joints. There is no evidence of joint deformity or effusions. There is no focal joint tenderness. Psychological/psychiatric: There is no clinical evidence of anxiety, depression, or melancholy. Lab data:      Results for orders placed or performed during the hospital encounter of 07/03/19   CBC WITH 3 PART DIFF     Status: Abnormal   Result Value Ref Range Status    WBC 4.6 4.5 - 13.0 K/uL Final    RBC 3.48 (L) 4.10 - 5.10 M/uL Final    HGB 10.7 (L) 12.0 - 16.0 g/dL Final    HCT 33.0 (L) 36 - 48 % Final    MCV 94.8 78 - 102 FL Final    MCH 30.7 25.0 - 35.0 PG Final    MCHC 32.4 31 - 37 g/dL Final    RDW 12.9 11.5 - 14.5 % Final    PLATELET 606 747 - 457 K/uL Final    NEUTROPHILS 47 40 - 70 % Final    MIXED CELLS 7 0.1 - 17 % Final    LYMPHOCYTES 46 (H) 14 - 44 % Final    ABS. NEUTROPHILS 2.2 1.8 - 9.5 K/UL Final    ABS. MIXED CELLS 0.3 0.0 - 2.3 K/uL Final    ABS. LYMPHOCYTES 2.1 1.1 - 5.9 K/UL Final     Comment: Test performed at [address] [place] [address] or [facility] Location. Reviewed by Medical Director. DF AUTOMATED   Final           Assessment:     1. Iron deficiency anemia due to dietary causes    2. Vitamin D deficiency    3. Vitamin B12 deficiency      Plan:   Functional iron deficiency anemia: Have explained to the patient that the CBC for from today shows that her hemoglobin has declined from 11.2 g/dL down to 10.7 g/dL with a current hematocrit of 33%. At this time I will recheck her iron profile and ferritin levels. She will continue to take the oral iron supplement and the Centrum Silver daily. Vitamin D deficiency: The patient has been taking vitamin D 5000 units daily. Nonetheless I will check a vitamin D level and if it remains low then her treatment will be changed to vitamin D 50,000 units weekly for 12 weeks.     Vitamin B12 deficiency: I will check her vitamin B12 level at this time.  Vitamin B supplements will be offered pending the results of her test.    Follow-up in 8 weeks  Orders Placed This Encounter    COMPLETE CBC & AUTO DIFF WBC    InHouse CBC ([facility])     Standing Status:   Future     Number of Occurrences:   1     Standing Expiration Date:   4/09/3174    METABOLIC PANEL, COMPREHENSIVE     Standing Status:   Future     Number of Occurrences:   1     Standing Expiration Date:   7/3/2020    IRON PROFILE     Standing Status:   Future     Number of Occurrences:   1     Standing Expiration Date:   7/3/2020    FERRITIN     Standing Status:   Future     Number of Occurrences:   1     Standing Expiration Date:   7/3/2020    VITAMIN D, 25 HYDROXY     Standing Status:   Future     Number of Occurrences:   1     Standing Expiration Date:   7/3/2020    VITAMIN B12 & FOLATE     Standing Status:   Future     Number of Occurrences:   1     Standing Expiration Date:   7/3/2020       Amina Aiken MD  7/3/2019      Please note: This document has been produced using voice recognition software. Unrecognized errors in transcription may be present.

## 2019-07-04 LAB
25(OH)D3 SERPL-MCNC: 50.5 NG/ML (ref 32–100)
A-G RATIO,AGRAT: 2.1 RATIO (ref 1.1–2.6)
ALBUMIN SERPL-MCNC: 4.8 G/DL (ref 3.5–5)
ALP SERPL-CCNC: 80 U/L (ref 25–115)
ALT SERPL-CCNC: 12 U/L (ref 5–40)
ANION GAP SERPL CALC-SCNC: 17 MMOL/L
AST SERPL W P-5'-P-CCNC: 16 U/L (ref 10–37)
BILIRUB SERPL-MCNC: 0.3 MG/DL (ref 0.2–1.2)
BUN SERPL-MCNC: 12 MG/DL (ref 6–22)
CALCIUM SERPL-MCNC: 9.9 MG/DL (ref 8.4–10.5)
CHLORIDE SERPL-SCNC: 107 MMOL/L (ref 98–110)
CO2 SERPL-SCNC: 22 MMOL/L (ref 20–32)
CREAT SERPL-MCNC: 0.7 MG/DL (ref 0.5–1.2)
FE % SATURATION,PSAT: 15 % (ref 20–50)
FERRITIN SERPL-MCNC: 82 NG/ML (ref 10–291)
FOLATE,FOL: 13.27 NG/ML
GFRAA, 66117: >60
GFRNA, 66118: >60
GLOBULIN,GLOB: 2.3 G/DL (ref 2–4)
GLUCOSE SERPL-MCNC: 83 MG/DL (ref 70–99)
IRON,IRN: 43 MCG/DL (ref 30–160)
POTASSIUM SERPL-SCNC: 4.3 MMOL/L (ref 3.5–5.5)
PROT SERPL-MCNC: 7.1 G/DL (ref 6.4–8.3)
SODIUM SERPL-SCNC: 146 MMOL/L (ref 133–145)
TIBC,TIBC: 277 MCG/DL (ref 228–428)
UIBC SERPL-MCNC: 234 MCG/DL (ref 110–370)
VIT B12 SERPL-MCNC: 1525 PG/ML (ref 211–911)

## 2019-07-05 LAB
GLIADIN IGA, GIGA: 3 U/ML
GLIADIN IGG, GIGG: <0.5 U/ML
IMMUNOGLOBULIN A, QN, SERUM, 001784: 176 MG/DL (ref 70–400)
T-TRANSGLUTAMINASE, IGA, 164643: <0.5 U/ML
TTG IGG SER-ACNC: <0.8 U/ML

## 2019-07-18 ENCOUNTER — OFFICE VISIT (OUTPATIENT)
Dept: ONCOLOGY | Age: 54
End: 2019-07-18

## 2019-07-18 VITALS
HEIGHT: 66 IN | WEIGHT: 143 LBS | BODY MASS INDEX: 22.98 KG/M2 | OXYGEN SATURATION: 98 % | TEMPERATURE: 98.2 F | RESPIRATION RATE: 16 BRPM | DIASTOLIC BLOOD PRESSURE: 65 MMHG | SYSTOLIC BLOOD PRESSURE: 94 MMHG | HEART RATE: 100 BPM

## 2019-07-18 DIAGNOSIS — E55.9 VITAMIN D DEFICIENCY: ICD-10-CM

## 2019-07-18 DIAGNOSIS — D50.8 IRON DEFICIENCY ANEMIA DUE TO DIETARY CAUSES: Primary | ICD-10-CM

## 2019-07-18 DIAGNOSIS — E53.8 VITAMIN B12 DEFICIENCY: ICD-10-CM

## 2019-07-18 RX ORDER — GABAPENTIN 300 MG/1
CAPSULE ORAL
Status: ON HOLD | COMMUNITY
End: 2019-10-21

## 2019-07-18 RX ORDER — TOPIRAMATE 25 MG/1
TABLET ORAL
Status: ON HOLD | COMMUNITY
End: 2019-10-21

## 2019-07-18 RX ORDER — LOPERAMIDE HYDROCHLORIDE 2 MG/1
CAPSULE ORAL
Status: ON HOLD | COMMUNITY
End: 2019-10-21

## 2019-07-18 RX ORDER — BENZOCAINE .13; .15; .5; 2 G/100G; G/100G; G/100G; G/100G
GEL ORAL
Refills: 5 | Status: ON HOLD | COMMUNITY
Start: 2019-05-21 | End: 2019-10-21

## 2019-07-18 RX ORDER — WARFARIN 2.5 MG/1
TABLET ORAL
Status: ON HOLD | COMMUNITY
End: 2019-10-21

## 2019-07-18 RX ORDER — MULTIVITAMIN
CAPSULE ORAL
Status: ON HOLD | COMMUNITY
End: 2019-10-21

## 2019-07-18 RX ORDER — TRAZODONE HYDROCHLORIDE 100 MG/1
TABLET ORAL
Refills: 0 | Status: ON HOLD | COMMUNITY
Start: 2019-04-18 | End: 2019-10-21

## 2019-07-18 RX ORDER — WARFARIN SODIUM 5 MG/1
TABLET ORAL
Status: ON HOLD | COMMUNITY
End: 2019-10-21

## 2019-07-18 RX ORDER — CHOLECALCIFEROL (VITAMIN D3) 50 MCG
CAPSULE ORAL
Status: ON HOLD | COMMUNITY
End: 2019-10-21

## 2019-07-18 NOTE — PATIENT INSTRUCTIONS
Iron Deficiency Anemia: Care Instructions  Your Care Instructions    Anemia means that you do not have enough red blood cells. Red blood cells carry oxygen around your body. When you have anemia, it can make you pale, weak, and tired. Many things can cause anemia. The most common cause is loss of blood. This can happen if you have heavy menstrual periods. It can also happen if you have bleeding in your stomach or bowel. You can also get anemia if you don't have enough iron in your diet or if it's hard for your body to absorb iron. In some cases, pregnancy causes anemia. That's because a pregnant woman needs more iron. Your doctor may do more tests to find the cause of your anemia. If a disease or other health problem is causing it, your doctor will treat that problem. It's important to follow up with your doctor to make sure that your iron level returns to normal.  Follow-up care is a key part of your treatment and safety. Be sure to make and go to all appointments, and call your doctor if you are having problems. It's also a good idea to know your test results and keep a list of the medicines you take. How can you care for yourself at home? · If your doctor recommended iron pills, take them as directed. ? Try to take the pills on an empty stomach. You can do this about 1 hour before or 2 hours after meals. But you may need to take iron with food to avoid an upset stomach. ? Do not take antacids or drink milk or anything with caffeine within 2 hours of when you take your iron. They can keep your body from absorbing the iron well. ? Vitamin C helps your body absorb iron. You may want to take iron pills with a glass of orange juice or some other food high in vitamin C.  ? Iron pills may cause stomach problems. These include heartburn, nausea, diarrhea, constipation, and cramps. It can help to drink plenty of fluids and include fruits, vegetables, and fiber in your diet.   ? It's normal for iron pills to make your stool a greenish or grayish black. But internal bleeding can also cause dark stool. So it's important to tell your doctor about any color changes. ? Call your doctor if you think you are having a problem with your iron pills. Even after you start to feel better, it will take several months for your body to build up its supply of iron. ? If you miss a pill, don't take a double dose. ? Keep iron pills out of the reach of small children. Too much iron can be very dangerous. · Eat foods with a lot of iron. These include red meat, shellfish, poultry, and eggs. They also include beans, raisins, whole-grain bread, and leafy green vegetables. · Steam your vegetables. This is the best way to prepare them if you want to get as much iron as possible. · Be safe with medicines. Do not take nonsteroidal anti-inflammatory pain relievers unless your doctor tells you to. These include aspirin, naproxen (Aleve), and ibuprofen (Advil, Motrin). · Liquid iron can stain your teeth. But you can mix it with water or juice and drink it with a straw. Then it won't get on your teeth. When should you call for help? Call 911 anytime you think you may need emergency care. For example, call if:    · You passed out (lost consciousness).    Call your doctor now or seek immediate medical care if:    · You are short of breath.     · You are dizzy or light-headed, or you feel like you may faint.     · You have new or worse bleeding.    Watch closely for changes in your health, and be sure to contact your doctor if:    · You feel weaker or more tired than usual.     · You do not get better as expected. Where can you learn more? Go to http://sugey-adeline.info/. Enter R984 in the search box to learn more about \"Iron Deficiency Anemia: Care Instructions. \"  Current as of: May 6, 2018  Content Version: 11.9  © 1675-2221 [employer].  Care instructions adapted under license by [employer] (which disclaims liability or warranty for this information). If you have questions about a medical condition or this instruction, always ask your healthcare professional. [address] any warranty or liability for your use of this information.

## 2019-07-18 NOTE — PROGRESS NOTES
Hematology/medical oncology progress note    7/18/2019  Chace Perez  YOB: 1965    PCP: Dr. Ching Dillard    Diagnosis: Iron deficiency anemia/chronic anemia    Ms. Gwendolyn Villalba return to clinic today to review additional test results. I have informed the patient that her vitamin B12 level and folate levels from 7/3/2019 were 1525 and 13.27 respectively. Her vitamin D level was normal at 50.5 ng/mL. She has subsequently undergone biopsies of the small intestine to assess for possible celiac disease. The celiac antibody profile was negative for normal.  I have recommended the patient continue taking her oral iron supplement. We will reassess her in 8 weeks and if the hemoglobin remains below 11 g/dL we will schedule her for bone marrow biopsy for further assessment. Total time 20 minutes, greater than 50% of the time was in counseling and coordination of care. Satinder Fernandez MD, [address]

## 2019-09-25 ENCOUNTER — OFFICE VISIT (OUTPATIENT)
Dept: ONCOLOGY | Age: 54
End: 2019-09-25

## 2019-09-25 VITALS
WEIGHT: 144 LBS | RESPIRATION RATE: 16 BRPM | BODY MASS INDEX: 23.14 KG/M2 | SYSTOLIC BLOOD PRESSURE: 102 MMHG | HEART RATE: 76 BPM | DIASTOLIC BLOOD PRESSURE: 67 MMHG | HEIGHT: 66 IN | OXYGEN SATURATION: 100 %

## 2019-09-25 DIAGNOSIS — D64.9 ANEMIA, UNSPECIFIED TYPE: ICD-10-CM

## 2019-09-25 DIAGNOSIS — E53.8 VITAMIN B12 DEFICIENCY: ICD-10-CM

## 2019-09-25 DIAGNOSIS — E55.9 VITAMIN D DEFICIENCY: ICD-10-CM

## 2019-09-25 DIAGNOSIS — D50.8 IRON DEFICIENCY ANEMIA SECONDARY TO INADEQUATE DIETARY IRON INTAKE: Primary | ICD-10-CM

## 2019-09-25 NOTE — PROGRESS NOTES
Hematology/Oncology  Progress Note    Name: Floyd Mcgovern  Date: 2019  : 1965    PCP: Rob Dawson MD     Ms. Clemente Watts is a 47 y.o. -American woman with functional iron deficiency. Current therapy Slow Fe 1 tablet daily. Subjective:     Ms. Clemente Watts is a 51-year-old  functional iron deficiency anemia. At her last clinic visit I recommended that she began taking Slow Fe 1 tablet daily. The goal was to slowly raise her hemoglobin and ferritin level. At her last clinic visit her hemoglobin was 10.9 g/dL with hematocrit of 32.9. She is tolerating oral iron therapy reasonably well and has no complaints of constipation or abdominal cramps. The patient reports that she has gained almost 10 pounds since her last clinic visit. Patient is here today for her 8 week follow up. She said that she is always week and tired. She also said that she gets small bruises on her arm for no reason, then it goes away after 3-4 days. She has no other physical complaints to report at this time. Past medical history, family history, and social history: these were reviewed and remains unchanged. Past Medical History:   Diagnosis Date    Anemia NEC     Avulsion fracture of ankle     right lat malleolus    Diffuse cystic mastopathy     Herpes progenitalis     Thromboembolus ([facility] [place] [address] 14    right leg    Vertigo 9-4-14.       Past Surgical History:   Procedure Laterality Date    DELIVERY       DELIVERY   ,    HX GYN      uterine ablation    HX ORTHOPAEDIC      carpal tunnel of left wrist    HX SEPTOPLASTY       Social History     Socioeconomic History    Marital status:      Spouse name: Not on file    Number of children: Not on file    Years of education: Not on file    Highest education level: Not on file   Occupational History    Occupation: Mortician   Social Needs    Financial resource strain: Not on file    Food insecurity:     Worry: Not on file     Inability: Not on file    Transportation needs:     Medical: Not on file     Non-medical: Not on file   Tobacco Use    Smoking status: Never Smoker    Smokeless tobacco: Never Used   Substance and Sexual Activity    Alcohol use: No    Drug use: No    Sexual activity: Yes     Partners: Male   Lifestyle    Physical activity:     Days per week: Not on file     Minutes per session: Not on file    Stress: Not on file   Relationships    Social connections:     Talks on phone: Not on file     Gets together: Not on file     Attends [employer] service: Not on file     Active member of club or organization: Not on file     Attends meetings of clubs or organizations: Not on file     Relationship status: Not on file    Intimate partner violence:     Fear of current or ex partner: Not on file     Emotionally abused: Not on file     Physically abused: Not on file     Forced sexual activity: Not on file   Other Topics Concern    Not on file   Social History Narrative    Not on file     Family History   Problem Relation Age of Onset    High Cholesterol Mother     Thyroid Disease Mother      Current Outpatient Medications   Medication Sig Dispense Refill    traZODone (DESYREL) 100 mg tablet TAKE 1 TABLET (100 MG) BY MOUTH BEFORE BEDTIME  0    budesonide (RHINOCORT AQUA) 32 mcg/actuation nasal spray USE 1 SPRAY 2 TIMES PER DAY INTO EACH NOSTRIL DAILY  5    Omeprazole Magnesium (ACID REDUCER, OMEPRAZOLE,) 20 mg cpDR omeprazole 20 mg capsule,delayed release      multivitamin capsule multivitamin      ibuprofen-capsai-m-sal-menthol 800 mg-0.025 %- 25 %-6 % KTLT ibuprofen 800 mg tablet      warfarin (COUMADIN) 2.5 mg tablet warfarin 2.5 mg tablet      warfarin (COUMADIN) 5 mg tablet warfarin 5 mg tablet      gabapentin (NEURONTIN) 300 mg capsule gabapentin 300 mg capsule      topiramate (TOPAMAX) 25 mg tablet topiramate 25 mg tablet      loperamide (IMODIUM) 2 mg capsule loperamide 2 mg capsule  multivitamin capsule multivitamin      Omeprazole Magnesium 20 mg cpDR omeprazole 20 mg capsule,delayed release      albuterol (PROVENTIL HFA, VENTOLIN HFA, PROAIR HFA) 90 mcg/actuation inhaler 1-2 Puffs.  amitriptyline (ELAVIL) 25 mg tablet amitriptyline 25 mg tablet      amoxicillin (AMOXIL) 875 mg tablet amoxicillin 875 mg tablet      azithromycin (ZITHROMAX) 250 mg tablet USE AS DIRECTED  0    bisacodyl (DULCOLAX) 5 mg EC tablet TAKE 2 TABS 1 HR PRIOR TO EACH COLON PREP ORALLY 2 DAYS  0    cefUROXime (CEFTIN) 250 mg tablet cefuroxime axetil 250 mg tablet      cephALEXin (KEFLEX) 500 mg capsule cephalexin 500 mg capsule      ciprofloxacin HCl (CILOXAN) 0.3 % ophthalmic solution ciprofloxacin 0.3 % eye drops      ciprofloxacin HCl (CIPRO) 250 mg tablet ciprofloxacin 250 mg tablet      dabigatran etexilate (PRADAXA) 150 mg capsule Pradaxa 150 mg capsule      enoxaparin (LOVENOX) 60 mg/0.6 mL injection enoxaparin 60 mg/0.6 mL subcutaneous syringe      gabapentin (NEURONTIN) 300 mg capsule gabapentin 300 mg capsule      gentamicin (GARAMYCIN) 0.1 % topical cream gentamicin 0.1 % topical cream      HYDROcodone-ibuprofen (VICOPROFEN) 7.5-200 mg per tablet hydrocodone 7.5 mg-ibuprofen 200 mg tablet      hyoscyamine SL (LEVSIN/SL) 0.125 mg SL tablet hyoscyamine 0.125 mg sublingual tablet      ibuprofen (MOTRIN) 400 mg tablet 400 mg.  loperamide (IMODIUM) 2 mg capsule loperamide 2 mg capsule      loperamide (IMMODIUM) 2 mg tablet 2 mg.       metroNIDAZOLE (FLAGYL) 500 mg tablet metronidazole 500 mg tablet      nitrofurantoin, macrocrystal-monohydrate, (MACROBID) 100 mg capsule nitrofurantoin monohydrate/macrocrystals 100 mg capsule      ondansetron (ZOFRAN ODT) 4 mg disintegrating tablet ondansetron 4 mg disintegrating tablet      oxyCODONE-acetaminophen (PERCOCET 10)  mg per tablet oxycodone-acetaminophen 10 mg-325 mg tablet      PEG 3350-Electrolytes (GAVILYTE-C) 825-09.40-4.45 -5.84 gram solution Gavilyte-C 240 gram-22.72 gram-6.72 gram-5.84 gram oral solution      GAVILYTE-G 236-22.74-6.74 -5.86 gram suspension TAKE AS DIRECTED FOR 2 DAYS  0    predniSONE (DELTASONE) 10 mg tablet prednisone 10 mg tablet      sertraline (ZOLOFT) 50 mg tablet sertraline 50 mg tablet   TAKE 2 TABLETS BY MOUTH EVERY DAY      sertraline (ZOLOFT) 50 mg tablet       topiramate (TOPAMAX) 25 mg tablet topiramate 25 mg tablet      venlafaxine (EFFEXOR) 75 mg tablet TAKE 1 TABLET BY MOUTH EVERY DAY  5    warfarin (COUMADIN) 2.5 mg tablet warfarin 2.5 mg tablet      warfarin (COUMADIN) 5 mg tablet warfarin 5 mg tablet      ibuprofen-famotidine 800-26.6 mg tab ibuprofen 800 mg tablet      ascorbic acid (VITAMIN C) 1,000 mg tablet Take 1 Tab by mouth daily. 30 Tab 5    MULTIVITAMINS,THERAPEUTIC (THERAGRAN PO) Take 1 Tab by mouth daily. Review of Systems  Constitutional: The patient has no acute distress or discomfort. HEENT: The patient denies recent head trauma, eye pain, blurred vision,  hearing deficit, oropharyngeal mucosal pain or lesions, and the patient denies throat pain or discomfort. Lymphatics: The patient denies palpable peripheral lymphadenopathy. Hematologic: The patient denies having bruising, bleeding, or progressive fatigue. Respiratory: Patient denies having shortness of breath, cough, sputum production, fever, or dyspnea on exertion. Cardiovascular: The patient denies having leg pain, leg swelling, heart palpitations, chest permit, chest pain, or lightheadedness. The patient denies having dyspnea on exertion. Gastrointestinal: The patient denies having nausea, emesis, or diarrhea. The patient denies having any hematemesis or blood in the stool. Genitourinary: Patient denies having urinary urgency, frequency, or dysuria. The patient denies having blood in the urine.   Psychological: The patient denies having symptoms of nervousness, anxiety, depression, or thoughts of harming self. Skin: Patient denies having skin rashes, skin, ulcerations, or unexplained itching or pruritus. Musculoskeletal: The patient denies having pain in the joints or bones. Objective:     Visit Vitals  /67   Pulse 76   Resp 16   Ht 5' 6\" (1.676 m)   Wt 65.3 kg (144 lb)   SpO2 100%   BMI 23.24 kg/m²     ECOG PS=0; Pain score=0/10    Physical Exam:   Gen. Appearance: The patient is in no acute distress. Skin: There is no bruise or rash. HEENT: The exam is unremarkable. Neck: Supple without lymphadenopathy or thyromegaly. Lungs: Clear to auscultation and percussion; there are no wheezes or rhonchi. Heart: Regular rate and rhythm; there are no murmurs, gallops, or rubs. Abdomen: Bowel sounds are present and normal.  There is no guarding, tenderness, or hepatosplenomegaly. Extremities: There is no clubbing, cyanosis, or edema. Neurologic: There are no focal neurologic deficits. Lymphatics: There is no palpable peripheral lymphadenopathy. Musculoskeletal: The patient has full range of motion at all joints. There is no evidence of joint deformity or effusions. There is no focal joint tenderness. Psychological/psychiatric: There is no clinical evidence of anxiety, depression, or melancholy. Lab data:      Results for orders placed or performed during the hospital encounter of 07/03/19   CBC WITH 3 PART DIFF     Status: Abnormal   Result Value Ref Range Status    WBC 4.6 4.5 - 13.0 K/uL Final    RBC 3.48 (L) 4.10 - 5.10 M/uL Final    HGB 10.7 (L) 12.0 - 16.0 g/dL Final    HCT 33.0 (L) 36 - 48 % Final    MCV 94.8 78 - 102 FL Final    MCH 30.7 25.0 - 35.0 PG Final    MCHC 32.4 31 - 37 g/dL Final    RDW 12.9 11.5 - 14.5 % Final    PLATELET 841 904 - 014 K/uL Final    NEUTROPHILS 47 40 - 70 % Final    MIXED CELLS 7 0.1 - 17 % Final    LYMPHOCYTES 46 (H) 14 - 44 % Final    ABS. NEUTROPHILS 2.2 1.8 - 9.5 K/UL Final    ABS. MIXED CELLS 0.3 0.0 - 2.3 K/uL Final    ABS.  LYMPHOCYTES 2.1 1.1 - 5.9 K/UL Final     Comment: Test performed at [address] [place] [address] or [facility] Location. Reviewed by Medical Director. DF AUTOMATED   Final           Assessment:     1. Iron deficiency anemia secondary to inadequate dietary iron intake    2. Vitamin D deficiency    3. Vitamin B12 deficiency      Plan:   Functional iron deficiency anemia: Have explained to the patient that the CBC  from 07/03/2019 showed that her hemoglobin has declined from 11.2 g/dL down to 10.7 g/dL with a current hematocrit of 33%. At this time I will recheck her iron profile and ferritin levels. She will continue to take the oral iron supplement and the Centrum Silver daily. CBC and CMP will be obtained today also. If her hemoglobin and hematocrit come back low, patient will be scheduled for bone marrow biopsy. Patient agreed to this plan. Vitamin D deficiency: The Vitamin D level on 7/4/2019 was sufficient at 50.5 ng/mL. Vitamin B12 deficiency: The recent Vitamin B 12 level on 7/3/2019 was 1,525 pg/mL and Folate level of 13.27 ng/mL. Follow-up in 4 weeks  Orders Placed This Encounter    CBC WITH AUTOMATED DIFF     Standing Status:   Future     Number of Occurrences:   1     Standing Expiration Date:   9/25/2020    IRON PROFILE     Standing Status:   Future     Number of Occurrences:   1     Standing Expiration Date:   9/25/2020    FERRITIN     Standing Status:   Future     Number of Occurrences:   1     Standing Expiration Date:   6/16/0422    METABOLIC PANEL, COMPREHENSIVE     Standing Status:   Future     Number of Occurrences:   1     Standing Expiration Date:   9/25/2020       Normie Brittle, NP  9/25/2019     I have assessed the patient independently and  agree with the full assessment as outlined. Vic Guerra MD, FACP      Please note: This document has been produced using voice recognition software. Unrecognized errors in transcription may be present.

## 2019-09-25 NOTE — PATIENT INSTRUCTIONS
Anemia: Care Instructions Your Care Instructions Anemia is a low level of red blood cells, which carry oxygen throughout your body. Many things can cause anemia. Lack of iron is one of the most common causes. Your body needs iron to make hemoglobin, a substance in red blood cells that carries oxygen from the lungs to your body's cells. Without enough iron, the body produces fewer and smaller red blood cells. As a result, your body's cells do not get enough oxygen, and you feel tired and weak. And you may have trouble concentrating. Bleeding is the most common cause of a lack of iron. You may have heavy menstrual bleeding or bleeding caused by conditions such as ulcers, hemorrhoids, or cancer. Regular use of aspirin or other anti-inflammatory medicines (such as ibuprofen) also can cause bleeding in some people. A lack of iron in your diet also can cause anemia, especially at times when the body needs more iron, such as during pregnancy, infancy, and the teen years. Your doctor may have prescribed iron pills. It may take several months of treatment for your iron levels to return to normal. Your doctor also may suggest that you eat foods that are rich in iron, such as meat and beans. There are many other causes of anemia. It is not always due to a lack of iron. Finding the specific cause of your anemia will help your doctor find the right treatment for you. Follow-up care is a key part of your treatment and safety. Be sure to make and go to all appointments, and call your doctor if you are having problems. It's also a good idea to know your test results and keep a list of the medicines you take. How can you care for yourself at home? · Take your medicines exactly as prescribed. Call your doctor if you think you are having a problem with your medicine. · If your doctor recommends iron pills, take them as directed: ? Try to take the pills on an empty stomach about 1 hour before or 2 hours after meals. But you may need to take iron with food to avoid an upset stomach. ? Do not take antacids or drink milk or caffeine drinks (such as coffee, tea, or cola) at the same time or within 2 hours of the time that you take your iron. They can make it hard for your body to absorb the iron. ? Vitamin C (from food or supplements) helps your body absorb iron. Try taking iron pills with a glass of orange juice or some other food that is high in vitamin C, such as citrus fruits. ? Iron pills may cause stomach problems, such as heartburn, nausea, diarrhea, constipation, and cramps. Be sure to drink plenty of fluids, and include fruits, vegetables, and fiber in your diet each day. Iron pills often make your bowel movements dark or green. ? If you forget to take an iron pill, do not take a double dose of iron the next time you take a pill. ? Keep iron pills out of the reach of small children. An overdose of iron can be very dangerous. · Follow your doctor's advice about eating iron-rich foods. These include red meat, shellfish, poultry, eggs, beans, raisins, whole-grain bread, and leafy green vegetables. · Steam vegetables to help them keep their iron content. When should you call for help? Call 911 anytime you think you may need emergency care. For example, call if: 
  · You have symptoms of a heart attack. These may include: 
? Chest pain or pressure, or a strange feeling in the chest. 
? Sweating. ? Shortness of breath. ? Nausea or vomiting. ? Pain, pressure, or a strange feeling in the back, neck, jaw, or upper belly or in one or both shoulders or arms. ? Lightheadedness or sudden weakness. ? A fast or irregular heartbeat. After you call 911, the  may tell you to chew 1 adult-strength or 2 to 4 low-dose aspirin. Wait for an ambulance. Do not try to drive yourself.  
  · You passed out (lost consciousness).  
 Call your doctor now or seek immediate medical care if:   · You have new or increased shortness of breath.  
  · You are dizzy or lightheaded, or you feel like you may faint.  
  · Your fatigue and weakness continue or get worse.  
  · You have any abnormal bleeding, such as: 
? Nosebleeds. ? Vaginal bleeding that is different (heavier, more frequent, at a different time of the month) than what you are used to. 
? Bloody or black stools, or rectal bleeding. ? Bloody or pink urine.  
 Watch closely for changes in your health, and be sure to contact your doctor if: 
  · You do not get better as expected. Where can you learn more? Go to http://sugey-adeline.info/. Enter R301 in the search box to learn more about \"Anemia: Care Instructions. \" Current as of: March 28, 2019 Content Version: 12.2 © 7344-0650 [employer]. Care instructions adapted under license by [employer] (which disclaims liability or warranty for this information). If you have questions about a medical condition or this instruction, always ask your healthcare professional. [place] [address] any warranty or liability for your use of this information.

## 2019-09-26 LAB
A-G RATIO,AGRAT: 1.9 RATIO (ref 1.1–2.6)
ABSOLUTE LYMPHOCYTE COUNT, 10803: 1.9 K/UL (ref 1–4.8)
ALBUMIN SERPL-MCNC: 4.5 G/DL (ref 3.5–5)
ALP SERPL-CCNC: 83 U/L (ref 25–115)
ALT SERPL-CCNC: 9 U/L (ref 5–40)
ANION GAP SERPL CALC-SCNC: 16 MMOL/L
AST SERPL W P-5'-P-CCNC: 14 U/L (ref 10–37)
BASOPHILS # BLD: 0 K/UL (ref 0–0.2)
BASOPHILS NFR BLD: 1 % (ref 0–2)
BILIRUB SERPL-MCNC: 0.2 MG/DL (ref 0.2–1.2)
BUN SERPL-MCNC: 11 MG/DL (ref 6–22)
CALCIUM SERPL-MCNC: 9.4 MG/DL (ref 8.4–10.5)
CHLORIDE SERPL-SCNC: 103 MMOL/L (ref 98–110)
CO2 SERPL-SCNC: 23 MMOL/L (ref 20–32)
CREAT SERPL-MCNC: 0.7 MG/DL (ref 0.5–1.2)
EOSINOPHIL # BLD: 0.2 K/UL (ref 0–0.5)
EOSINOPHIL NFR BLD: 4 % (ref 0–6)
ERYTHROCYTE [DISTWIDTH] IN BLOOD BY AUTOMATED COUNT: 12.1 % (ref 10–15.5)
FE % SATURATION,PSAT: 20 % (ref 20–50)
FERRITIN SERPL-MCNC: 155 NG/ML (ref 10–291)
GFRAA, 66117: >60
GFRNA, 66118: >60
GLOBULIN,GLOB: 2.4 G/DL (ref 2–4)
GLUCOSE SERPL-MCNC: 86 MG/DL (ref 70–99)
GRANULOCYTES,GRANS: 46 % (ref 40–75)
HCT VFR BLD AUTO: 33.6 % (ref 35.1–48)
HGB BLD-MCNC: 10.7 G/DL (ref 11.7–16)
IRON,IRN: 49 MCG/DL (ref 30–160)
LYMPHOCYTES, LYMLT: 39 % (ref 20–45)
MCH RBC QN AUTO: 31 PG (ref 26–34)
MCHC RBC AUTO-ENTMCNC: 32 G/DL (ref 31–36)
MCV RBC AUTO: 97 FL (ref 80–95)
MONOCYTES # BLD: 0.5 K/UL (ref 0.1–1)
MONOCYTES NFR BLD: 11 % (ref 3–12)
NEUTROPHILS # BLD AUTO: 2.3 K/UL (ref 1.8–7.7)
PLATELET # BLD AUTO: 255 K/UL (ref 140–440)
PMV BLD AUTO: 10.9 FL (ref 9–13)
POTASSIUM SERPL-SCNC: 4 MMOL/L (ref 3.5–5.5)
PROT SERPL-MCNC: 6.9 G/DL (ref 6.4–8.3)
RBC # BLD AUTO: 3.48 M/UL (ref 3.8–5.2)
SODIUM SERPL-SCNC: 142 MMOL/L (ref 133–145)
TIBC,TIBC: 248 MCG/DL (ref 228–428)
UIBC SERPL-MCNC: 199 MCG/DL (ref 110–370)
WBC # BLD AUTO: 4.9 K/UL (ref 4–11)

## 2019-09-27 ENCOUNTER — DOCUMENTATION ONLY (OUTPATIENT)
Dept: INFUSION THERAPY | Age: 54
End: 2019-09-27

## 2019-10-14 ENCOUNTER — TELEPHONE (OUTPATIENT)
Dept: ONCOLOGY | Age: 54
End: 2019-10-14

## 2019-10-14 DIAGNOSIS — D64.9 ANEMIA, UNSPECIFIED TYPE: Primary | ICD-10-CM

## 2019-10-14 NOTE — TELEPHONE ENCOUNTER
Vicki Beck w/ Interventional Radiology called and stated that she received orders for this patient to be set up for a bone marrow BX. She states that the patient wants to [address] to be done at [facility] or [facility]. It's closer to where she lives. Can you please resend the orders. Please call Vicki Beck if you need her for anything. She states she doesn't schedule for [facility].

## 2019-10-21 ENCOUNTER — HOSPITAL ENCOUNTER (OUTPATIENT)
Dept: CT IMAGING | Age: 54
Discharge: HOME | End: 2019-10-21
Attending: RADIOLOGY | Admitting: RADIOLOGY
Payer: COMMERCIAL

## 2019-10-21 VITALS
WEIGHT: 144 LBS | HEIGHT: 66 IN | SYSTOLIC BLOOD PRESSURE: 98 MMHG | DIASTOLIC BLOOD PRESSURE: 55 MMHG | RESPIRATION RATE: 18 BRPM | HEART RATE: 68 BPM | BODY MASS INDEX: 23.14 KG/M2 | TEMPERATURE: 97.9 F | OXYGEN SATURATION: 100 %

## 2019-10-21 DIAGNOSIS — D64.9 ANEMIA, UNSPECIFIED TYPE: ICD-10-CM

## 2019-10-21 LAB
ANION GAP SERPL CALC-SCNC: 8 MMOL/L (ref 3–18)
APTT PPP: 30.2 SEC (ref 23–36.4)
BASOPHILS # BLD: 0 K/UL (ref 0–0.1)
BASOPHILS NFR BLD: 1 % (ref 0–2)
BUN SERPL-MCNC: 14 MG/DL (ref 7–18)
BUN/CREAT SERPL: 16 (ref 12–20)
CALCIUM SERPL-MCNC: 9.3 MG/DL (ref 8.5–10.1)
CHLORIDE SERPL-SCNC: 110 MMOL/L (ref 100–111)
CO2 SERPL-SCNC: 26 MMOL/L (ref 21–32)
CREAT SERPL-MCNC: 0.86 MG/DL (ref 0.6–1.3)
DIFFERENTIAL METHOD BLD: ABNORMAL
EOSINOPHIL # BLD: 0.1 K/UL (ref 0–0.4)
EOSINOPHIL NFR BLD: 3 % (ref 0–5)
ERYTHROCYTE [DISTWIDTH] IN BLOOD BY AUTOMATED COUNT: 12.9 % (ref 11.6–14.5)
GLUCOSE SERPL-MCNC: 86 MG/DL (ref 74–99)
HCT VFR BLD AUTO: 33.9 % (ref 35–45)
HGB BLD-MCNC: 11 G/DL (ref 12–16)
INR PPP: 1 (ref 0.8–1.2)
LYMPHOCYTES # BLD: 1.6 K/UL (ref 0.9–3.6)
LYMPHOCYTES NFR BLD: 39 % (ref 21–52)
MCH RBC QN AUTO: 30.9 PG (ref 24–34)
MCHC RBC AUTO-ENTMCNC: 32.4 G/DL (ref 31–37)
MCV RBC AUTO: 95.2 FL (ref 74–97)
MONOCYTES # BLD: 0.3 K/UL (ref 0.05–1.2)
MONOCYTES NFR BLD: 7 % (ref 3–10)
NEUTS SEG # BLD: 2.1 K/UL (ref 1.8–8)
NEUTS SEG NFR BLD: 50 % (ref 40–73)
PLATELET # BLD AUTO: 257 K/UL (ref 135–420)
PMV BLD AUTO: 10.7 FL (ref 9.2–11.8)
POTASSIUM SERPL-SCNC: 4.1 MMOL/L (ref 3.5–5.5)
PROTHROMBIN TIME: 12.7 SEC (ref 11.5–15.2)
RBC # BLD AUTO: 3.56 M/UL (ref 4.2–5.3)
SODIUM SERPL-SCNC: 144 MMOL/L (ref 136–145)
WBC # BLD AUTO: 4.2 K/UL (ref 4.6–13.2)

## 2019-10-21 PROCEDURE — 85730 THROMBOPLASTIN TIME PARTIAL: CPT

## 2019-10-21 PROCEDURE — 74011000250 HC RX REV CODE- 250: Performed by: RADIOLOGY

## 2019-10-21 PROCEDURE — 38221 DX BONE MARROW BIOPSIES: CPT

## 2019-10-21 PROCEDURE — 88184 FLOWCYTOMETRY/ TC 1 MARKER: CPT

## 2019-10-21 PROCEDURE — 88237 TISSUE CULTURE BONE MARROW: CPT

## 2019-10-21 PROCEDURE — 88264 CHROMOSOME ANALYSIS 20-25: CPT

## 2019-10-21 PROCEDURE — 88185 FLOWCYTOMETRY/TC ADD-ON: CPT

## 2019-10-21 PROCEDURE — 74011250636 HC RX REV CODE- 250/636: Performed by: RADIOLOGY

## 2019-10-21 PROCEDURE — 80048 BASIC METABOLIC PNL TOTAL CA: CPT

## 2019-10-21 PROCEDURE — 88305 TISSUE EXAM BY PATHOLOGIST: CPT

## 2019-10-21 PROCEDURE — 88311 DECALCIFY TISSUE: CPT

## 2019-10-21 PROCEDURE — 85610 PROTHROMBIN TIME: CPT

## 2019-10-21 PROCEDURE — 85025 COMPLETE CBC W/AUTO DIFF WBC: CPT

## 2019-10-21 PROCEDURE — 88313 SPECIAL STAINS GROUP 2: CPT

## 2019-10-21 RX ORDER — FENTANYL CITRATE 50 UG/ML
25-200 INJECTION, SOLUTION INTRAMUSCULAR; INTRAVENOUS
Status: DISCONTINUED | OUTPATIENT
Start: 2019-10-21 | End: 2019-10-21 | Stop reason: HOSPADM

## 2019-10-21 RX ORDER — MIDAZOLAM HYDROCHLORIDE 1 MG/ML
.5-4 INJECTION, SOLUTION INTRAMUSCULAR; INTRAVENOUS
Status: DISCONTINUED | OUTPATIENT
Start: 2019-10-21 | End: 2019-10-21 | Stop reason: HOSPADM

## 2019-10-21 RX ORDER — LANOLIN ALCOHOL/MO/W.PET/CERES
CREAM (GRAM) TOPICAL
COMMUNITY
End: 2021-12-16

## 2019-10-21 RX ORDER — HYDROCODONE BITARTRATE AND ACETAMINOPHEN 7.5; 325 MG/1; MG/1
1 TABLET ORAL
Status: DISCONTINUED | OUTPATIENT
Start: 2019-10-21 | End: 2019-10-21 | Stop reason: HOSPADM

## 2019-10-21 RX ORDER — HEPARIN SODIUM 1000 [USP'U]/ML
5000 INJECTION, SOLUTION INTRAVENOUS; SUBCUTANEOUS
Status: COMPLETED | OUTPATIENT
Start: 2019-10-21 | End: 2019-10-21

## 2019-10-21 RX ORDER — LIDOCAINE HYDROCHLORIDE 10 MG/ML
1-20 INJECTION INFILTRATION; PERINEURAL
Status: COMPLETED | OUTPATIENT
Start: 2019-10-21 | End: 2019-10-21

## 2019-10-21 RX ORDER — SODIUM CHLORIDE 9 MG/ML
20 INJECTION, SOLUTION INTRAVENOUS CONTINUOUS
Status: DISCONTINUED | OUTPATIENT
Start: 2019-10-21 | End: 2019-10-21 | Stop reason: HOSPADM

## 2019-10-21 RX ADMIN — LIDOCAINE HYDROCHLORIDE 10 ML: 10 INJECTION, SOLUTION INFILTRATION; PERINEURAL at 10:27

## 2019-10-21 RX ADMIN — FENTANYL CITRATE 50 MCG: 50 INJECTION INTRAMUSCULAR; INTRAVENOUS at 10:28

## 2019-10-21 RX ADMIN — FENTANYL CITRATE 50 MCG: 50 INJECTION INTRAMUSCULAR; INTRAVENOUS at 10:21

## 2019-10-21 RX ADMIN — MIDAZOLAM 1 MG: 1 INJECTION INTRAMUSCULAR; INTRAVENOUS at 10:28

## 2019-10-21 RX ADMIN — HEPARIN SODIUM 5000 UNITS: 1000 INJECTION, SOLUTION INTRAVENOUS; SUBCUTANEOUS at 10:30

## 2019-10-21 RX ADMIN — MIDAZOLAM 1 MG: 1 INJECTION INTRAMUSCULAR; INTRAVENOUS at 10:21

## 2019-10-21 RX ADMIN — SODIUM CHLORIDE 20 ML/HR: 900 INJECTION, SOLUTION INTRAVENOUS at 08:29

## 2019-10-21 NOTE — INTERVAL H&P NOTE
VASCULAR & INTERVENTIONAL RADIOLOGY PROGRESS NOTE    History and Physical reviewed; I have examined the patient and there are no pertinent changes. Pt is an appropriate candidate to undergo BM asp/bx under moderate sedation.       Mann Cazares MD, MD  Vascular & Interventional Radiology  [facility]  10/21/2019

## 2019-10-21 NOTE — PROGRESS NOTES
Pt educated about procedure and sedation, verbalizes understanding. Pt denies complications from sedation/anesthesia in the past. Pt states she is not taking anticoagulants/antiplatelet agents, medication has been updated by [facility] to reflect current medications. Pt also denies the possibility of being pregnant. Lung sounds clear to ausculation, S1 S2 heart tones noted, no murmurs or rubs noted. Will continue to monitor.

## 2019-10-21 NOTE — DISCHARGE INSTRUCTIONS
Patient Education        Bone Marrow Aspiration and Biopsy: What to Expect at Home  Your Recovery  The biopsy site may feel sore for several days. It can help to walk, take pain medicine, and put ice packs on the site. You will probably be able to return to work and your usual activities the day after the procedure. Your doctor or nurse will call you with the results of your test.  This care sheet gives you a general idea about how long it will take for you to recover. But each person recovers at a different pace. Follow the steps below to get better as quickly as possible. How can you care for yourself at home? Activity    · Rest when you feel tired. Getting enough sleep will help you recover.     · You may drive when you are no longer taking pain pills and can quickly move your foot from the gas pedal to the brake. You must also be able to sit comfortably for a long period of time, even if you do not plan to go far. You might get caught in traffic.     · Most people are able to return to work the day after the procedure. Medicines    · Your doctor will tell you if and when you can restart your medicines. He or she will also give you instructions about taking any new medicines.     · If you take blood thinners, such as warfarin (Coumadin), clopidogrel (Plavix), or aspirin, be sure to talk to your doctor. He or she will tell you if and when to start taking those medicines again. Make sure that you understand exactly what your doctor wants you to do.     · Be safe with medicines. Take pain medicines exactly as directed. ? If the doctor gave you a prescription medicine for pain, take it as prescribed. ? If you are not taking a prescription pain medicine, take an over-the-counter medicine such as acetaminophen (Tylenol), ibuprofen (Advil, Motrin), or naproxen (Aleve). Read and follow all instructions on the label. ? Do not take two or more pain medicines at the same time unless the doctor told you to.  Many pain medicines have acetaminophen, which is Tylenol. Too much acetaminophen (Tylenol) can be harmful.     · If you think your pain medicine is making you sick to your stomach:  ? Take your medicine after meals (unless your doctor has told you not to). ? Ask your doctor for a different pain medicine.     · If your doctor prescribed antibiotics, take them as directed. Do not stop taking them just because you feel better.    Ice    · Put ice or a cold pack on the biopsy site for 10 to 20 minutes at a time. Put a thin cloth between the ice and your skin. Follow-up care is a key part of your treatment and safety. Be sure to make and go to all appointments, and call your doctor if you are having problems. It's also a good idea to know your test results and keep a list of the medicines you take. When should you call for help? Call 911 anytime you think you may need emergency care. For example, call if:    · You passed out (lost consciousness).    Call your doctor now or seek immediate medical care if:    · You have signs of infection, such as:  ? Increased pain, swelling, warmth, or redness. ? Red streaks leading from the biopsy site. ? Pus draining from the biopsy site. ? Swollen lymph nodes in your neck, armpits, or groin. ? A fever.    Watch closely for any changes in your health, and be sure to contact your doctor if:    · You are not getting better as expected. Where can you learn more? Go to http://sugey-adeline.info/. Enter E148 in the search box to learn more about \"Bone Marrow Aspiration and Biopsy: What to Expect at Home. \"  Current as of: March 28, 2019  Content Version: 12.2  © 8763-7144 [employer]. Care instructions adapted under license by [employer] (which disclaims liability or warranty for this information).  If you have questions about a medical condition or this instruction, always ask your healthcare professional. Juan Means any warranty or liability for your use of this information. DISCHARGE SUMMARY from Nurse    PATIENT INSTRUCTIONS:    After general anesthesia or intravenous sedation, for 24 hours or while taking prescription Narcotics:  · Limit your activities  · Do not drive and operate hazardous machinery  · Do not make important personal or business decisions  · Do  not drink alcoholic beverages  · If you have not urinated within 8 hours after discharge, please contact your surgeon on call. Report the following to your surgeon:  · Excessive pain, swelling, redness or odor of or around the surgical area  · Temperature over 100.5  · Nausea and vomiting lasting longer than 4 hours or if unable to take medications  · Any signs of decreased circulation or nerve impairment to extremity: change in color, persistent  numbness, tingling, coldness or increase pain  · Any questions    What to do at Home:    These are general instructions for a healthy lifestyle:    No smoking/ No tobacco products/ Avoid exposure to second hand smoke  Surgeon General's Warning:  Quitting smoking now greatly reduces serious risk to your health. Obesity, smoking, and sedentary lifestyle greatly increases your risk for illness    A healthy diet, regular physical exercise & weight monitoring are important for maintaining a healthy lifestyle    You may be retaining fluid if you have a history of heart failure or if you experience any of the following symptoms:  Weight gain of 3 pounds or more overnight or 5 pounds in a week, increased swelling in our hands or feet or shortness of breath while lying flat in bed. Please call your doctor as soon as you notice any of these symptoms; do not wait until your next office visit. The discharge information has been reviewed with the patient. The patient verbalized understanding.   Discharge medications reviewed with the patient and appropriate educational materials and side effects teaching were provided. ___________________________________________________________________________________________________________________________________  Patient armband removed and given to patient to take home.   Patient was informed of the privacy risks if armband lost or stolen

## 2019-10-21 NOTE — PROGRESS NOTES
TRANSFER - OUT REPORT:    Verbal report given to [address] Mclain RN(name) on Rylie Guajardo  being transferred to [facility] Phase 2 recovery(unit) for routine progression of care       Report consisted of patients Situation, Background, Assessment and   Recommendations(SBAR). Information from the following report(s) SBAR, Procedure Summary, MAR and Cardiac Rhythm SR was reviewed with the receiving nurse. Lines:   Peripheral IV 10/21/19 Left Antecubital (Active)   Site Assessment Clean, dry, & intact 10/21/2019  8:29 AM   Phlebitis Assessment 0 10/21/2019  8:29 AM   Infiltration Assessment 0 10/21/2019  8:29 AM   Dressing Status Clean, dry, & intact 10/21/2019  8:29 AM   Dressing Type Transparent;Tape 10/21/2019  8:29 AM   Hub Color/Line Status Pink; Infusing 10/21/2019  8:29 AM        Opportunity for questions and clarification was provided. Patient transported with:   Tech     Pt appropriate for transport tech transfer.

## 2019-10-21 NOTE — PERIOP NOTES
Pre-Op Summary    Pt arrived via car with family/friend and is oriented to time, place, person and situation. Patient with steady gait with none assistive devices. Visit Vitals  /69 (BP 1 Location: Left arm, BP Patient Position: At rest)   Pulse 79   Temp 97.9 °F (36.6 °C)   Resp 16   Ht 5' 6\" (1.676 m)   Wt 65.3 kg (144 lb)   SpO2 100%   BMI 23.24 kg/m²       Peripheral IV located on Left antecubital .    Patients belongings are located with . Patient's point of contact is husbandCoral Parker  and their contact number is: [phone]. They will be in the waiting room. They are able to receive medication information. They will be their ride home.

## 2019-10-24 ENCOUNTER — OFFICE VISIT (OUTPATIENT)
Dept: ONCOLOGY | Age: 54
End: 2019-10-24

## 2019-10-24 VITALS
TEMPERATURE: 98.9 F | HEART RATE: 85 BPM | OXYGEN SATURATION: 98 % | RESPIRATION RATE: 16 BRPM | HEIGHT: 66 IN | BODY MASS INDEX: 23.24 KG/M2 | SYSTOLIC BLOOD PRESSURE: 103 MMHG | DIASTOLIC BLOOD PRESSURE: 64 MMHG

## 2019-10-24 DIAGNOSIS — D50.8 IRON DEFICIENCY ANEMIA SECONDARY TO INADEQUATE DIETARY IRON INTAKE: Primary | ICD-10-CM

## 2019-10-24 DIAGNOSIS — D64.9 NORMOCYTIC ANEMIA: ICD-10-CM

## 2019-10-24 NOTE — PROGRESS NOTES
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 Stanley Fraser returns to clinic today and I have informed her that the bone marrow biopsy that was completed on 10/21/2019 shows that she has a normocytic anemia with a moderately hypocellular bone marrow with trilineage hematopoiesis. There was no evidence of acute leukemia, lymphoma, metastatic neoplasm or progressive myelodysplastic syndrome. On 10/21/2019 her CBC revealed that her hemoglobin was 11 g/dL with hematocrit of 33.9%. The iron profile and ferritin levels have been normal.  Flow cytometry from the bone marrow showed no immunophenotypic aberrancy. I have explained to the patient that with hypocellularity in the bone marrow this is the primary cause of her chronic anemia. At some point she will most likely develop myelodysplastic syndrome. At the present time the best option of management is oral iron therapy once daily. I have recommended that she begin taking a daily multivitamin with mineral supplement preferably a liquid formulation for better absorption. I will continue to reassess her at 2 months intervals. Additionally, I have explained to the patient that with a hemoglobin between 10.5 and 11, this will be considered a good result since she does have a hypocellular bone marrow. She had her questions answered to her satisfaction. Follow-up in 2 months. Total time 25 minutes, greater than 50% of the time was in counseling and coordination of care. Satinder Angela MD, Jessica Beebe

## 2019-10-25 NOTE — PATIENT INSTRUCTIONS
Iron Deficiency Anemia: Care Instructions  Your Care Instructions    Anemia means that you do not have enough red blood cells. Red blood cells carry oxygen around your body. When you have anemia, it can make you pale, weak, and tired. Many things can cause anemia. The most common cause is loss of blood. This can happen if you have heavy menstrual periods. It can also happen if you have bleeding in your stomach or bowel. You can also get anemia if you don't have enough iron in your diet or if it's hard for your body to absorb iron. In some cases, pregnancy causes anemia. That's because a pregnant woman needs more iron. Your doctor may do more tests to find the cause of your anemia. If a disease or other health problem is causing it, your doctor will treat that problem. It's important to follow up with your doctor to make sure that your iron level returns to normal.  Follow-up care is a key part of your treatment and safety. Be sure to make and go to all appointments, and call your doctor if you are having problems. It's also a good idea to know your test results and keep a list of the medicines you take. How can you care for yourself at home? · If your doctor recommended iron pills, take them as directed. ? Try to take the pills on an empty stomach. You can do this about 1 hour before or 2 hours after meals. But you may need to take iron with food to avoid an upset stomach. ? Do not take antacids or drink milk or anything with caffeine within 2 hours of when you take your iron. They can keep your body from absorbing the iron well. ? Vitamin C helps your body absorb iron. You may want to take iron pills with a glass of orange juice or some other food high in vitamin C.  ? Iron pills may cause stomach problems. These include heartburn, nausea, diarrhea, constipation, and cramps. It can help to drink plenty of fluids and include fruits, vegetables, and fiber in your diet.   ? It's normal for iron pills to make your stool a greenish or grayish black. But internal bleeding can also cause dark stool. So it's important to tell your doctor about any color changes. ? Call your doctor if you think you are having a problem with your iron pills. Even after you start to feel better, it will take several months for your body to build up its supply of iron. ? If you miss a pill, don't take a double dose. ? Keep iron pills out of the reach of small children. Too much iron can be very dangerous. · Eat foods with a lot of iron. These include red meat, shellfish, poultry, and eggs. They also include beans, raisins, whole-grain bread, and leafy green vegetables. · Steam your vegetables. This is the best way to prepare them if you want to get as much iron as possible. · Be safe with medicines. Do not take nonsteroidal anti-inflammatory pain relievers unless your doctor tells you to. These include aspirin, naproxen (Aleve), and ibuprofen (Advil, Motrin). · Liquid iron can stain your teeth. But you can mix it with water or juice and drink it with a straw. Then it won't get on your teeth. When should you call for help? Call 911 anytime you think you may need emergency care. For example, call if:    · You passed out (lost consciousness).    Call your doctor now or seek immediate medical care if:    · You are short of breath.     · You are dizzy or light-headed, or you feel like you may faint.     · You have new or worse bleeding.    Watch closely for changes in your health, and be sure to contact your doctor if:    · You feel weaker or more tired than usual.     · You do not get better as expected. Where can you learn more? Go to http://sugey-adeline.info/. Enter N775 in the search box to learn more about \"Iron Deficiency Anemia: Care Instructions. \"  Current as of: March 28, 2019  Content Version: 12.2  © 0846-8010 [employer].  Care instructions adapted under license by [employer] (which disclaims liability or warranty for this information). If you have questions about a medical condition or this instruction, always ask your healthcare professional. [address] any warranty or liability for your use of this information.

## 2020-04-09 ENCOUNTER — HOSPITAL ENCOUNTER (OUTPATIENT)
Dept: ONCOLOGY | Age: 55
Discharge: HOME | End: 2020-04-09

## 2020-04-09 ENCOUNTER — OFFICE VISIT (OUTPATIENT)
Dept: ONCOLOGY | Age: 55
End: 2020-04-09

## 2020-04-09 VITALS
HEART RATE: 82 BPM | OXYGEN SATURATION: 99 % | DIASTOLIC BLOOD PRESSURE: 62 MMHG | TEMPERATURE: 97.6 F | HEIGHT: 66 IN | BODY MASS INDEX: 23.46 KG/M2 | SYSTOLIC BLOOD PRESSURE: 96 MMHG | RESPIRATION RATE: 16 BRPM | WEIGHT: 146 LBS

## 2020-04-09 DIAGNOSIS — D50.8 IRON DEFICIENCY ANEMIA SECONDARY TO INADEQUATE DIETARY IRON INTAKE: ICD-10-CM

## 2020-04-09 DIAGNOSIS — D50.8 IRON DEFICIENCY ANEMIA SECONDARY TO INADEQUATE DIETARY IRON INTAKE: Primary | ICD-10-CM

## 2020-04-09 DIAGNOSIS — E55.9 VITAMIN D DEFICIENCY: ICD-10-CM

## 2020-04-09 DIAGNOSIS — E53.8 VITAMIN B12 DEFICIENCY: ICD-10-CM

## 2020-04-09 LAB
BASO+EOS+MONOS # BLD AUTO: 0.2 K/UL (ref 0–2.3)
BASO+EOS+MONOS NFR BLD AUTO: 5 % (ref 0.1–17)
DIFFERENTIAL METHOD BLD: ABNORMAL
ERYTHROCYTE [DISTWIDTH] IN BLOOD BY AUTOMATED COUNT: 12.5 % (ref 11.5–14.5)
HCT VFR BLD AUTO: 32.5 % (ref 36–48)
HGB BLD-MCNC: 10.7 G/DL (ref 12–16)
LYMPHOCYTES # BLD: 2.3 K/UL (ref 1.1–5.9)
LYMPHOCYTES NFR BLD: 48 % (ref 14–44)
MCH RBC QN AUTO: 31.6 PG (ref 25–35)
MCHC RBC AUTO-ENTMCNC: 32.9 G/DL (ref 31–37)
MCV RBC AUTO: 95.9 FL (ref 78–102)
NEUTS SEG # BLD: 2.4 K/UL (ref 1.8–9.5)
NEUTS SEG NFR BLD: 47 % (ref 40–70)
PLATELET # BLD AUTO: 251 K/UL (ref 140–440)
RBC # BLD AUTO: 3.39 M/UL (ref 4.1–5.1)
WBC # BLD AUTO: 4.9 K/UL (ref 4.5–13)

## 2020-04-09 NOTE — PATIENT INSTRUCTIONS
Iron Deficiency Anemia: Care Instructions Your Care Instructions Anemia means that you do not have enough red blood cells. Red blood cells carry oxygen around your body. When you have anemia, it can make you pale, weak, and tired. Many things can cause anemia. The most common cause is loss of blood. This can happen if you have heavy menstrual periods. It can also happen if you have bleeding in your stomach or bowel. You can also get anemia if you don't have enough iron in your diet or if it's hard for your body to absorb iron. In some cases, pregnancy causes anemia. That's because a pregnant woman needs more iron. Your doctor may do more tests to find the cause of your anemia. If a disease or other health problem is causing it, your doctor will treat that problem. It's important to follow up with your doctor to make sure that your iron level returns to normal. 
Follow-up care is a key part of your treatment and safety. Be sure to make and go to all appointments, and call your doctor if you are having problems. It's also a good idea to know your test results and keep a list of the medicines you take. How can you care for yourself at home? · If your doctor recommended iron pills, take them as directed. ? Try to take the pills on an empty stomach. You can do this about 1 hour before or 2 hours after meals. But you may need to take iron with food to avoid an upset stomach. ? Do not take antacids or drink milk or anything with caffeine within 2 hours of when you take your iron. They can keep your body from absorbing the iron well. ? Vitamin C helps your body absorb iron. You may want to take iron pills with a glass of orange juice or some other food high in vitamin C. 
? Iron pills may cause stomach problems. These include heartburn, nausea, diarrhea, constipation, and cramps. It can help to drink plenty of fluids and include fruits, vegetables, and fiber in your diet. ? It's normal for iron pills to make your stool a greenish or grayish black. But internal bleeding can also cause dark stool. So it's important to tell your doctor about any color changes. ? Call your doctor if you think you are having a problem with your iron pills. Even after you start to feel better, it will take several months for your body to build up its supply of iron. ? If you miss a pill, don't take a double dose. ? Keep iron pills out of the reach of small children. Too much iron can be very dangerous. · Eat foods with a lot of iron. These include red meat, shellfish, poultry, and eggs. They also include beans, raisins, whole-grain bread, and leafy green vegetables. · Steam your vegetables. This is the best way to prepare them if you want to get as much iron as possible. · Be safe with medicines. Do not take nonsteroidal anti-inflammatory pain relievers unless your doctor tells you to. These include aspirin, naproxen (Aleve), and ibuprofen (Advil, Motrin). · Liquid iron can stain your teeth. But you can mix it with water or juice and drink it with a straw. Then it won't get on your teeth. When should you call for help? Call 911 anytime you think you may need emergency care. For example, call if: 
  · You passed out (lost consciousness).  
 Call your doctor now or seek immediate medical care if: 
  · You are short of breath.  
  · You are dizzy or light-headed, or you feel like you may faint.  
  · You have new or worse bleeding.  
 Watch closely for changes in your health, and be sure to contact your doctor if: 
  · You feel weaker or more tired than usual.  
  · You do not get better as expected. Where can you learn more? Go to http://sugey-adeline.info/ Enter E374 in the search box to learn more about \"Iron Deficiency Anemia: Care Instructions. \" Current as of: November 7, 2019Content Version: 12.4 © 3355-5561 [employer] Incorporated. Care instructions adapted under license by [employer] (which disclaims liability or warranty for this information). If you have questions about a medical condition or this instruction, always ask your healthcare professional. [address] any warranty or liability for your use of this information.

## 2020-04-09 NOTE — PROGRESS NOTES
Hematology/Oncology  Progress Note    Name: Tameka Chung  Date: 2020  : 1965    PCP: Mike Gomes MD     Ms. Fabian Mack is a 54 y.o. -American woman with functional iron deficiency. Current therapy Slow Fe 1 tablet daily. Subjective:     Ms. Fabian Mack is a 71-year-old  functional iron deficiency anemia. At her last clinic visit I recommended that she began taking Slow Fe 1 tablet daily. The goal was to slowly raise her hemoglobin and ferritin level. At her last clinic visit her hemoglobin was 10.9 g/dL with hematocrit of 32.9. She is tolerating oral iron therapy reasonably well and has no complaints of constipation or abdominal cramps. The patient reports that she has gained almost 10 pounds since her last clinic visit. Past medical history, family history, and social history: these were reviewed and remains unchanged. Past Medical History:   Diagnosis Date    Anemia NEC     Avulsion fracture of ankle     right lat malleolus    Diffuse cystic mastopathy     Herpes progenitalis     Thromboembolus ([address] 14    right leg    Vertigo 9-4-14.       Past Surgical History:   Procedure Laterality Date    DELIVERY       DELIVERY   ,    HX GYN      uterine ablation    HX ORTHOPAEDIC      carpal tunnel of left wrist    HX SEPTOPLASTY       Social History     Socioeconomic History    Marital status:      Spouse name: Not on file    Number of children: Not on file    Years of education: Not on file    Highest education level: Not on file   Occupational History    Occupation: Mortician   Social Needs    Financial resource strain: Not on file    Food insecurity     Worry: Not on file     Inability: Not on file   [employer] needs     Medical: Not on file     Non-medical: Not on file   Tobacco Use    Smoking status: Never Smoker    Smokeless tobacco: Never Used   Substance and Sexual Activity    Alcohol use: No    Drug use: No    Sexual activity: Yes     Partners: Male   Lifestyle    Physical activity     Days per week: Not on file     Minutes per session: Not on file    Stress: Not on file   Relationships    Social connections     Talks on phone: Not on file     Gets together: Not on file     Attends [employer] service: Not on file     Active member of club or organization: Not on file     Attends meetings of clubs or organizations: Not on file     Relationship status: Not on file    Intimate partner violence     Fear of current or ex partner: Not on file     Emotionally abused: Not on file     Physically abused: Not on file     Forced sexual activity: Not on file   Other Topics Concern    Not on file   Social History Narrative    Not on file     Family History   Problem Relation Age of Onset    High Cholesterol Mother     Thyroid Disease Mother      Current Outpatient Medications   Medication Sig Dispense Refill    ferrous sulfate (IRON) 325 mg (65 mg iron) tablet Take  by mouth Daily (before breakfast).  albuterol (PROVENTIL HFA, VENTOLIN HFA, PROAIR HFA) 90 mcg/actuation inhaler 1-2 Puffs.  sertraline (ZOLOFT) 50 mg tablet          Review of Systems  Constitutional: The patient has no acute distress or discomfort. HEENT: The patient denies recent head trauma, eye pain, blurred vision,  hearing deficit, oropharyngeal mucosal pain or lesions, and the patient denies throat pain or discomfort. Lymphatics: The patient denies palpable peripheral lymphadenopathy. Hematologic: The patient denies having bruising, bleeding, or progressive fatigue. Respiratory: Patient denies having shortness of breath, cough, sputum production, fever, or dyspnea on exertion. Cardiovascular: The patient denies having leg pain, leg swelling, heart palpitations, chest permit, chest pain, or lightheadedness. The patient denies having dyspnea on exertion. Gastrointestinal: The patient denies having nausea, emesis, or diarrhea.  The patient denies having any hematemesis or blood in the stool. Genitourinary: Patient denies having urinary urgency, frequency, or dysuria. The patient denies having blood in the urine. Psychological: The patient denies having symptoms of nervousness, anxiety, depression, or thoughts of harming self. Skin: Patient denies having skin rashes, skin, ulcerations, or unexplained itching or pruritus. Musculoskeletal: The patient denies having pain in the joints or bones. Objective:     Visit Vitals  BP 96/62   Pulse 82   Temp 97.6 °F (36.4 °C) (Oral)   Resp 16   Ht 5' 6\" (1.676 m)   Wt 66.2 kg (146 lb)   SpO2 99%   BMI 23.57 kg/m²     ECOG PS=0; Pain score=0/10    Physical Exam:   Gen. Appearance: The patient is in no acute distress. Skin: There is no bruise or rash. HEENT: The exam is unremarkable. Neck: Supple without lymphadenopathy or thyromegaly. Lungs: Clear to auscultation and percussion; there are no wheezes or rhonchi. Heart: Regular rate and rhythm; there are no murmurs, gallops, or rubs. Abdomen: Bowel sounds are present and normal.  There is no guarding, tenderness, or hepatosplenomegaly. Extremities: There is no clubbing, cyanosis, or edema. Neurologic: There are no focal neurologic deficits. Lymphatics: There is no palpable peripheral lymphadenopathy. Musculoskeletal: The patient has full range of motion at all joints. There is no evidence of joint deformity or effusions. There is no focal joint tenderness. Psychological/psychiatric: There is no clinical evidence of anxiety, depression, or melancholy.     Lab data:      Results for orders placed or performed during the hospital encounter of 04/09/20   CBC WITH 3 PART DIFF     Status: Abnormal   Result Value Ref Range Status    WBC 4.9 4.5 - 13.0 K/uL Final    RBC 3.39 (L) 4.10 - 5.10 M/uL Final    HGB 10.7 (L) 12.0 - 16.0 g/dL Final    HCT 32.5 (L) 36 - 48 % Final    MCV 95.9 78 - 102 FL Final    MCH 31.6 25.0 - 35.0 PG Final    MCHC 32.9 31 - 37 g/dL Final    RDW 12.5 11.5 - 14.5 % Final    PLATELET 439 897 - 092 K/uL Final    NEUTROPHILS 47 40 - 70 % Final    MIXED CELLS 5 0.1 - 17 % Final    LYMPHOCYTES 48 (H) 14 - 44 % Final    ABS. NEUTROPHILS 2.4 1.8 - 9.5 K/UL Final    ABS. MIXED CELLS 0.2 0.0 - 2.3 K/uL Final    ABS. LYMPHOCYTES 2.3 1.1 - 5.9 K/UL Final     Comment: Test performed at [address] [place] [address] or [facility] Location. Reviewed by Medical Director. DF AUTOMATED   Final           Assessment:     1. Iron deficiency anemia secondary to inadequate dietary iron intake    2. Vitamin D deficiency    3. Vitamin B12 deficiency      Plan:   Functional iron deficiency anemia: Have explained to the patient that the CBC for from today shows that her hemoglobin is currently 10.7 g/dL with hematocrit of 32.5%. At this time I will recheck her iron profile and ferritin levels. She will continue to take the oral iron supplement and the Centrum Silver daily. Vitamin D deficiency: The patient has been taking vitamin D 5000 units daily. Nonetheless I will check a vitamin D level and if it remains low then her treatment will be changed to vitamin D 50,000 units weekly for 12 weeks. Last follow-up vitamin D level was 50 ng/mL. Vitamin B12 deficiency: I will check her vitamin B12 level at this time. The most recent vitamin B12 level was normal with a normal folate level.     Follow-up in 4 months  Orders Placed This Encounter    COMPLETE CBC & AUTO DIFF WBC    InHouse CBC ([facility])     Standing Status:   Future     Number of Occurrences:   1     Standing Expiration Date:   8/11/3928    METABOLIC PANEL, COMPREHENSIVE     Standing Status:   Future     Number of Occurrences:   1     Standing Expiration Date:   4/10/2021    IRON PROFILE     Standing Status:   Future     Number of Occurrences:   1     Standing Expiration Date:   4/10/2021    FERRITIN     Standing Status:   Future     Number of Occurrences:   1     Standing Expiration Date:   4/9/2021    VITAMIN D, 25 HYDROXY     Standing Status:   Future     Number of Occurrences:   1     Standing Expiration Date:   4/9/2021    VITAMIN B12 & FOLATE     Standing Status:   Future     Number of Occurrences:   1     Standing Expiration Date:   4/9/2021       Sharita Sanches MD  4/9/2020      Please note: This document has been produced using voice recognition software. Unrecognized errors in transcription may be present.

## 2020-04-10 LAB
25(OH)D3 SERPL-MCNC: 56.4 NG/ML (ref 32–100)
A-G RATIO,AGRAT: 2.4 RATIO (ref 1.1–2.6)
ALBUMIN SERPL-MCNC: 4.5 G/DL (ref 3.5–5)
ALP SERPL-CCNC: 75 U/L (ref 25–115)
ALT SERPL-CCNC: 9 U/L (ref 5–40)
ANION GAP SERPL CALC-SCNC: 13 MMOL/L
AST SERPL W P-5'-P-CCNC: 18 U/L (ref 10–37)
BILIRUB SERPL-MCNC: 0.3 MG/DL (ref 0.2–1.2)
BUN SERPL-MCNC: 13 MG/DL (ref 6–22)
CALCIUM SERPL-MCNC: 9.5 MG/DL (ref 8.4–10.5)
CHLORIDE SERPL-SCNC: 109 MMOL/L (ref 98–110)
CO2 SERPL-SCNC: 23 MMOL/L (ref 20–32)
CREAT SERPL-MCNC: 0.8 MG/DL (ref 0.5–1.2)
FE % SATURATION,PSAT: 31 % (ref 20–50)
FERRITIN SERPL-MCNC: 64 NG/ML (ref 10–291)
FOLATE,FOL: 15.92 NG/ML
GFRAA, 66117: >60
GFRNA, 66118: >60
GLOBULIN,GLOB: 1.9 G/DL (ref 2–4)
GLUCOSE SERPL-MCNC: 94 MG/DL (ref 70–99)
IRON,IRN: 90 MCG/DL (ref 30–160)
POTASSIUM SERPL-SCNC: 4.4 MMOL/L (ref 3.5–5.5)
PROT SERPL-MCNC: 6.4 G/DL (ref 6.4–8.3)
SODIUM SERPL-SCNC: 145 MMOL/L (ref 133–145)
TIBC,TIBC: 288 MCG/DL (ref 228–428)
UIBC SERPL-MCNC: 198 MCG/DL (ref 110–370)
VIT B12 SERPL-MCNC: 1709 PG/ML (ref 211–911)

## 2020-08-13 ENCOUNTER — TELEPHONE (OUTPATIENT)
Dept: ONCOLOGY | Age: 55
End: 2020-08-13

## 2020-08-13 PROBLEM — D50.9 IRON DEFICIENCY ANEMIA: Status: ACTIVE | Noted: 2020-08-13

## 2020-08-13 PROBLEM — E53.8 B12 DEFICIENCY: Status: ACTIVE | Noted: 2020-08-13

## 2020-10-30 ENCOUNTER — TRANSCRIBE ORDER (OUTPATIENT)
Dept: SLEEP MEDICINE | Age: 55
End: 2020-10-30

## 2020-10-30 DIAGNOSIS — G47.33 OSA (OBSTRUCTIVE SLEEP APNEA): Primary | ICD-10-CM

## 2020-11-16 ENCOUNTER — HOSPITAL ENCOUNTER (OUTPATIENT)
Dept: SLEEP MEDICINE | Age: 55
Discharge: HOME | End: 2020-11-16
Payer: COMMERCIAL

## 2020-11-16 DIAGNOSIS — G47.33 OSA (OBSTRUCTIVE SLEEP APNEA): ICD-10-CM

## 2020-11-16 PROCEDURE — 95806 SLEEP STUDY UNATT&RESP EFFT: CPT

## 2020-11-17 ENCOUNTER — HOSPITAL ENCOUNTER (OUTPATIENT)
Dept: SLEEP MEDICINE | Age: 55
Discharge: HOME | End: 2020-11-17
Payer: COMMERCIAL

## 2021-12-16 ENCOUNTER — HOSPITAL ENCOUNTER (EMERGENCY)
Age: 56
Discharge: HOME | End: 2021-12-16
Attending: STUDENT IN AN ORGANIZED HEALTH CARE EDUCATION/TRAINING PROGRAM
Payer: COMMERCIAL

## 2021-12-16 ENCOUNTER — APPOINTMENT (OUTPATIENT)
Dept: CT IMAGING | Age: 56
End: 2021-12-16
Attending: STUDENT IN AN ORGANIZED HEALTH CARE EDUCATION/TRAINING PROGRAM
Payer: COMMERCIAL

## 2021-12-16 VITALS
TEMPERATURE: 97.5 F | RESPIRATION RATE: 22 BRPM | DIASTOLIC BLOOD PRESSURE: 78 MMHG | BODY MASS INDEX: 22.5 KG/M2 | HEART RATE: 83 BPM | WEIGHT: 140 LBS | HEIGHT: 66 IN | OXYGEN SATURATION: 92 % | SYSTOLIC BLOOD PRESSURE: 140 MMHG

## 2021-12-16 DIAGNOSIS — J01.90 ACUTE SINUSITIS, RECURRENCE NOT SPECIFIED, UNSPECIFIED LOCATION: ICD-10-CM

## 2021-12-16 DIAGNOSIS — E87.6 HYPOKALEMIA: ICD-10-CM

## 2021-12-16 DIAGNOSIS — R04.0 EPISTAXIS: Primary | ICD-10-CM

## 2021-12-16 DIAGNOSIS — R51.9 NONINTRACTABLE HEADACHE, UNSPECIFIED CHRONICITY PATTERN, UNSPECIFIED HEADACHE TYPE: ICD-10-CM

## 2021-12-16 LAB
ANION GAP SERPL CALC-SCNC: 11 MMOL/L (ref 3–18)
BASOPHILS # BLD: 0.1 K/UL (ref 0–0.1)
BASOPHILS NFR BLD: 0 % (ref 0–2)
BUN SERPL-MCNC: 19 MG/DL (ref 7–18)
BUN/CREAT SERPL: 19 (ref 12–20)
CALCIUM SERPL-MCNC: 9.5 MG/DL (ref 8.5–10.1)
CHLORIDE SERPL-SCNC: 106 MMOL/L (ref 100–111)
CO2 SERPL-SCNC: 25 MMOL/L (ref 21–32)
CREAT SERPL-MCNC: 0.98 MG/DL (ref 0.6–1.3)
DIFFERENTIAL METHOD BLD: ABNORMAL
EOSINOPHIL # BLD: 0 K/UL (ref 0–0.4)
EOSINOPHIL NFR BLD: 0 % (ref 0–5)
ERYTHROCYTE [DISTWIDTH] IN BLOOD BY AUTOMATED COUNT: 12.4 % (ref 11.6–14.5)
GLUCOSE SERPL-MCNC: 132 MG/DL (ref 74–99)
HCT VFR BLD AUTO: 34.7 % (ref 35–45)
HGB BLD-MCNC: 11.7 G/DL (ref 12–16)
IMM GRANULOCYTES # BLD AUTO: 0.1 K/UL (ref 0–0.04)
IMM GRANULOCYTES NFR BLD AUTO: 0 % (ref 0–0.5)
INR PPP: 1 (ref 0.8–1.2)
LYMPHOCYTES # BLD: 2.1 K/UL (ref 0.9–3.6)
LYMPHOCYTES NFR BLD: 15 % (ref 21–52)
MCH RBC QN AUTO: 31.2 PG (ref 24–34)
MCHC RBC AUTO-ENTMCNC: 33.7 G/DL (ref 31–37)
MCV RBC AUTO: 92.5 FL (ref 78–100)
MONOCYTES # BLD: 0.7 K/UL (ref 0.05–1.2)
MONOCYTES NFR BLD: 5 % (ref 3–10)
NEUTS SEG # BLD: 11.1 K/UL (ref 1.8–8)
NEUTS SEG NFR BLD: 79 % (ref 40–73)
NRBC # BLD: 0 K/UL (ref 0–0.01)
NRBC BLD-RTO: 0 PER 100 WBC
PLATELET # BLD AUTO: 352 K/UL (ref 135–420)
PMV BLD AUTO: 10.5 FL (ref 9.2–11.8)
POTASSIUM SERPL-SCNC: 3.2 MMOL/L (ref 3.5–5.5)
PROTHROMBIN TIME: 13.3 SEC (ref 11.5–15.2)
RBC # BLD AUTO: 3.75 M/UL (ref 4.2–5.3)
SODIUM SERPL-SCNC: 142 MMOL/L (ref 136–145)
WBC # BLD AUTO: 14.1 K/UL (ref 4.6–13.2)

## 2021-12-16 PROCEDURE — 74011250636 HC RX REV CODE- 250/636: Performed by: STUDENT IN AN ORGANIZED HEALTH CARE EDUCATION/TRAINING PROGRAM

## 2021-12-16 PROCEDURE — 85610 PROTHROMBIN TIME: CPT

## 2021-12-16 PROCEDURE — 96374 THER/PROPH/DIAG INJ IV PUSH: CPT

## 2021-12-16 PROCEDURE — 99284 EMERGENCY DEPT VISIT MOD MDM: CPT

## 2021-12-16 PROCEDURE — 85025 COMPLETE CBC W/AUTO DIFF WBC: CPT

## 2021-12-16 PROCEDURE — 96375 TX/PRO/DX INJ NEW DRUG ADDON: CPT

## 2021-12-16 PROCEDURE — 80048 BASIC METABOLIC PNL TOTAL CA: CPT

## 2021-12-16 PROCEDURE — 70450 CT HEAD/BRAIN W/O DYE: CPT

## 2021-12-16 RX ORDER — POTASSIUM CHLORIDE 20 MEQ/1
40 TABLET, EXTENDED RELEASE ORAL
Status: DISCONTINUED | OUTPATIENT
Start: 2021-12-16 | End: 2021-12-16 | Stop reason: HOSPADM

## 2021-12-16 RX ORDER — FENTANYL CITRATE 50 UG/ML
50 INJECTION, SOLUTION INTRAMUSCULAR; INTRAVENOUS
Status: COMPLETED | OUTPATIENT
Start: 2021-12-16 | End: 2021-12-16

## 2021-12-16 RX ORDER — POTASSIUM CHLORIDE 20 MEQ/1
20 TABLET, EXTENDED RELEASE ORAL 3 TIMES DAILY
Qty: 9 TABLET | Refills: 0 | Status: SHIPPED | OUTPATIENT
Start: 2021-12-16 | End: 2021-12-19

## 2021-12-16 RX ORDER — POTASSIUM CHLORIDE 20 MEQ/1
20 TABLET, EXTENDED RELEASE ORAL 3 TIMES DAILY
Qty: 9 TABLET | Refills: 0 | Status: SHIPPED | OUTPATIENT
Start: 2021-12-16 | End: 2021-12-16 | Stop reason: SDUPTHER

## 2021-12-16 RX ORDER — DIPHENHYDRAMINE HYDROCHLORIDE 50 MG/ML
25 INJECTION, SOLUTION INTRAMUSCULAR; INTRAVENOUS ONCE
Status: COMPLETED | OUTPATIENT
Start: 2021-12-16 | End: 2021-12-16

## 2021-12-16 RX ORDER — METOCLOPRAMIDE HYDROCHLORIDE 5 MG/ML
5 INJECTION INTRAMUSCULAR; INTRAVENOUS ONCE
Status: COMPLETED | OUTPATIENT
Start: 2021-12-16 | End: 2021-12-16

## 2021-12-16 RX ADMIN — DIPHENHYDRAMINE HYDROCHLORIDE 25 MG: 50 INJECTION, SOLUTION INTRAMUSCULAR; INTRAVENOUS at 14:15

## 2021-12-16 RX ADMIN — METOCLOPRAMIDE 5 MG: 5 INJECTION, SOLUTION INTRAMUSCULAR; INTRAVENOUS at 14:12

## 2021-12-16 RX ADMIN — FENTANYL CITRATE 50 MCG: 50 INJECTION INTRAMUSCULAR; INTRAVENOUS at 14:17

## 2021-12-16 NOTE — DISCHARGE INSTRUCTIONS
Please take abx given by ENT. Please carefully read all discharge instructions    Please follow-up with a primary care physician and if you do not have one currently use the contact information provided to obtain an appointment. If none was provided please call the number on the back of your insurance card to locate a Primary care doctor. Many offices have \"cancellation lists\" that you can ask to be placed on; should a patient with an earlier appointment cancel you will be notified to take their place. Please return to the Emergency Room immediately if your symptoms worsen. Please return to the Emergency Department if you develop a fever, chills, cannot eat or drink due to nausea or vomiting, or if any of your symptoms worsen. If you do not have insurance you can use the below for your medications. [employer]Nduo.cn.Submitnet. Nduo.cn    What are GoodRx coupons? GoodRx coupons will help you pay less than the cash price for your prescription. [place] [employer] free to use and are accepted at virtually every [place] pharmacy. Your pharmacist will know how to enter the codes on the coupon to pull up the lowest discount available.

## 2021-12-16 NOTE — ED NOTES
Assumed care of patient, A&Ox4 resp even and unlabored, NAD noted or indicated, ambulates with steady gait. Pt presents to ed with complaints of post surgical pain. Pt sent by ENT office for pain control, pt was not able to swallow pain pill in office. Pt with some post nasal bleeding and complaints of headache.

## 2021-12-19 NOTE — ED PROVIDER NOTES
EMERGENCY DEPARTMENT HISTORY AND PHYSICAL EXAM      Date: 12/16/2021  Patient Name: Adolph Lesches    History of Presenting Illness     Chief Complaint   Patient presents with    Headache    Post-Op Problem       History Provided By: Patient and Patient's Son    HPI:  Adolph Lesches is a 64 y.o. female with history of recurrent sinus infections, balloon sinuplasty this morning who complains of severe headache, numbness in her throat as a result of lidocaine spray, inability to tolerate p.o. at her ENT office, who was sent here for further evaluation, she has nasal padding in place, with the mask, some nasal bleeding that is to be expected, no significant posterior epistaxis that is involving compromise of her airway. She has a severe headache, she was transferred to the emergency department as she had no ability to swallow due to the nebulized and viscous lidocaine to use for the procedure today. Initial interactions with patient family, son and patient, she was very upset and distressed that she had to wait someone in the waiting room, I believe that she was under the impression that she would be able to come over and get a quick IM shot of morphine however at the time of her arrival, we had several critically ill patients, during the interaction I attempted to explain that we do see the patient's in the order of illness and arrival.  After discussing this with patient and son, it is clear that she has a history of migraines however this headache is much worse than previous. Prior to today she has been feeling well. The patient denies any aggravating or alleviating factors - and has not taken any medications in an attempt to alleviate her symptoms. PMH, PSH, family history, social history, allergies reviewed with the patient with significant items noted above.     ---  Pregnancy -     PCP: Velma Franklin MD    Current Outpatient Medications   Medication Sig Dispense Refill    potassium chloride (K-DUR, KLOR-CON M20) 20 mEq tablet Take 1 Tablet by mouth three (3) times daily for 3 days. 9 Tablet 0       Past History     Past Medical History:  Past Medical History:   Diagnosis Date    Anemia NEC     Avulsion fracture of ankle     right lat malleolus    Diffuse cystic mastopathy     Herpes progenitalis     Thromboembolus ([facility] [address] 14    right leg    Vertigo 9-4-14. Past Surgical History:  Past Surgical History:   Procedure Laterality Date    DELIVERY       DELIVERY   ,    HX GYN      uterine ablation    HX ORTHOPAEDIC      carpal tunnel of left wrist    HX SEPTOPLASTY         Family History:  Family History   Problem Relation Age of Onset    High Cholesterol Mother     Thyroid Disease Mother        Social History:  Social History     Tobacco Use    Smoking status: Never Smoker    Smokeless tobacco: Never Used   Substance Use Topics    Alcohol use: No    Drug use: No       Allergies:   Allergies   Allergen Reactions    Other Food Other (comments)     Fresh fruits    Sulfa (Sulfonamide Antibiotics) Rash and Other (comments)     Intolerance       [place] Swelling    Lovenox [Enoxaparin] Other (comments)     Increased liver function       Review of Systems   Review of Systems    In addition to that documented in the HPI above  All other review of systems negative    Constitutional: Denies fevers or chills  Eyes: Denies vision changes  ENMT: Denies sore throat  CV: Denies chest pain  Resp: Denies SOB  GI: Denies vomiting or diarrhea  : Denies painful urination  MSK: Denies recent trauma  Skin: Denies new rashes  Neuro: Denies new numbness or tingling or weakness  Endocrine: Denies polyuria  Heme: Denies bleeding disorders    Physical Exam     Vitals:    21 1313 21 1417   BP: 119/77 (!) 140/78   Pulse: 83    Resp: 22    Temp: 97.5 °F (36.4 °C)    SpO2: 99% 92%   Weight: 63.5 kg (140 lb)    Height: 5' 6\" (1.676 m)      Physical Exam    Nursing notes and vital signs reviewed  General: Patient is awake and alert, appears uncomfortable, mild distress. Head: Normocephalic, Atraumatic  Eyes: EOMI, no conjunctival pallor  Neck: Supple, Normal external exam  Cardiovascular: RRR,  warm, well-perfused extremities  Chest: Normal work of breathing and chest excursion bilaterally  Respiratory: Patient is in no respiratory distress  Abdomen: Soft, non tender, non distended  Back: No evidence of trauma or deformity  Extremities: No evidence of trauma or deformity, no LE edema  Skin: Warm and dry, intact  Neuro: Alert and appropriate, CN intact, normal speech, strength and sensation full and symmetric bilaterally, normal gait, normal coordination  Psychiatric: Normal mood and affect    Medical Decision Making   I am the first provider for this patient. I reviewed the vital signs, available nursing notes, past medical history, past surgical history, family history and social history. Vital Signs-Reviewed the patient's vital signs. Visit Vitals  BP (!) 140/78   Pulse 83   Temp 97.5 °F (36.4 °C)   Resp 22   Ht 5' 6\" (1.676 m)   Wt 63.5 kg (140 lb)   SpO2 92%   BMI 22.60 kg/m²     Pulse Oximetry Analysis - 99% on room air     Cardiac Monitor:  Rate: 83 bpm  Rhythm: NSR    Provider Notes (Medical Decision Making):   Zack Hogan is a 64 y.o. female with post procedure pain, inability to tolerate p.o. due to numbness from viscous lidocaine. Headache, will rule out intracranial injury. Procedures:  Procedures    ED Course:   ED Course as of 12/19/21 0625   u Dec 16, 2021   1345 Attempted for 15-20 minutes to contact ENT.   [DM]   4921 Patient resting comfortably [DM]   1526 Leukocytosis expected after procedure, steroids, has abx at home.    [DM]   1529 CT HEAD WO CONT [DM]      ED Course User Index  [DM] Mihia Hansen MD      After medications for migraine, patient felt significantly better however had some mild desaturation need to be observed for an extended period of time. No acute pathology necessitating further emergent workup or hospital admission is suspected or found. Will discharge home with follow-up with ENT, they have already prescribed antibiotics, steroids. . She is comfortable with the plan and discharge at this time. Expressed the importance of follow up for current symptoms and she agrees and was advised on what signs/symptoms to return immediately to the ER. Vitals Review/addressed -     Diagnostic Study Results     Orders Placed This Encounter    CT HEAD WO CONT     Standing Status:   Standing     Number of Occurrences:   1     Order Specific Question:   Transport     Answer:   Ambulatory [1]     Order Specific Question:   Reason for Exam     Answer:   headache. Order Specific Question:   Decision Support Exception     Answer:   Emergency Medical Condition (MA) [1]    PROTHROMBIN TIME + INR     Standing Status:   Standing     Number of Occurrences:   1    CBC WITH AUTOMATED DIFF     Standing Status:   Standing     Number of Occurrences:   1    METABOLIC PANEL, BASIC     Standing Status:   Standing     Number of Occurrences:   1    fentaNYL citrate (PF) injection 50 mcg    diphenhydrAMINE (BENADRYL) injection 25 mg    metoclopramide HCl (REGLAN) injection 5 mg    DISCONTD: potassium chloride (K-DUR, KLOR-CON M20) SR tablet 40 mEq    DISCONTD: potassium chloride (K-DUR, KLOR-CON M20) 20 mEq tablet     Sig: Take 1 Tablet by mouth three (3) times daily for 3 days. Dispense:  9 Tablet     Refill:  0    potassium chloride (K-DUR, KLOR-CON M20) 20 mEq tablet     Sig: Take 1 Tablet by mouth three (3) times daily for 3 days. Dispense:  9 Tablet     Refill:  0       Labs -   No results found for this or any previous visit (from the past 12 hour(s)). Radiologic Studies -   CT HEAD WO CONT   Final Result      Normal CT of the brain.       Small quantity of fluid in the paranasal sinuses, low in density likely related   to acute sinusitis. Not measuring blood attenuation. CT Results  (Last 48 hours)    None        CXR Results  (Last 48 hours)    None          Disposition     Disposition:  Home    CLINICAL IMPRESSION:    1. Epistaxis    2. Nonintractable headache, unspecified chronicity pattern, unspecified headache type    3. Acute sinusitis, recurrence not specified, unspecified location    4. Hypokalemia        It should be noted that I will be the provider of record for this patient  Jill Lizarraga MD    Follow-up Information     Follow up With Specialties Details Why Contact Info    Lucina Carrington MD Family Medicine Call in 1 day  [place] [phone]  [phone] [address] [facility] Go to  If symptoms worsen [address] [facility]  [phone]          Discharge Medication List as of 12/16/2021  3:29 PM      CONTINUE these medications which have CHANGED    Details   potassium chloride (K-DUR, KLOR-CON M20) 20 mEq tablet Take 1 Tablet by mouth three (3) times daily for 3 days. , Normal, Disp-9 Tablet, R-0             Please note that this dictation was completed with [employer], the [employer] voice recognition software. Quite often unanticipated grammatical, syntax, homophones, and other interpretive errors are inadvertently transcribed by the computer software. Please disregard these errors. Please excuse any errors that have escaped final proofreading.

## 2022-03-18 PROBLEM — N92.1 METRORRHAGIA: Status: ACTIVE | Noted: 2018-11-07

## 2022-03-18 PROBLEM — K92.1 BLOOD IN STOOL: Status: ACTIVE | Noted: 2018-10-08

## 2022-03-19 PROBLEM — D50.9 IRON DEFICIENCY ANEMIA: Status: ACTIVE | Noted: 2020-08-13

## 2022-03-20 PROBLEM — R13.14 PHARYNGOESOPHAGEAL DYSPHAGIA: Status: ACTIVE | Noted: 2018-10-08

## 2022-03-20 PROBLEM — E53.8 B12 DEFICIENCY: Status: ACTIVE | Noted: 2020-08-13

## 2023-06-29 ENCOUNTER — ANESTHESIA EVENT (OUTPATIENT)
Facility: HOSPITAL | Age: 58
End: 2023-06-29
Payer: COMMERCIAL

## 2023-06-30 ENCOUNTER — HOSPITAL ENCOUNTER (OUTPATIENT)
Facility: HOSPITAL | Age: 58
Setting detail: OUTPATIENT SURGERY
Discharge: HOME | End: 2023-06-30
Attending: PODIATRIST | Admitting: PODIATRIST
Payer: COMMERCIAL

## 2023-06-30 ENCOUNTER — ANESTHESIA (OUTPATIENT)
Facility: HOSPITAL | Age: 58
End: 2023-06-30
Payer: COMMERCIAL

## 2023-06-30 VITALS
BODY MASS INDEX: 23.37 KG/M2 | HEART RATE: 83 BPM | OXYGEN SATURATION: 98 % | WEIGHT: 145.4 LBS | HEIGHT: 66 IN | RESPIRATION RATE: 16 BRPM | TEMPERATURE: 97.7 F | SYSTOLIC BLOOD PRESSURE: 124 MMHG | DIASTOLIC BLOOD PRESSURE: 76 MMHG

## 2023-06-30 PROCEDURE — 2580000003 HC RX 258: Performed by: PODIATRIST

## 2023-06-30 PROCEDURE — 7100000000 HC PACU RECOVERY - FIRST 15 MIN: Performed by: PODIATRIST

## 2023-06-30 PROCEDURE — 3700000000 HC ANESTHESIA ATTENDED CARE: Performed by: PODIATRIST

## 2023-06-30 PROCEDURE — 6370000000 HC RX 637 (ALT 250 FOR IP): Performed by: PODIATRIST

## 2023-06-30 PROCEDURE — C1713 ANCHOR/SCREW BN/BN,TIS/BN: HCPCS | Performed by: PODIATRIST

## 2023-06-30 PROCEDURE — A4217 STERILE WATER/SALINE, 500 ML: HCPCS | Performed by: PODIATRIST

## 2023-06-30 PROCEDURE — 6360000002 HC RX W HCPCS: Performed by: ANESTHESIOLOGY

## 2023-06-30 PROCEDURE — 3600000002 HC SURGERY LEVEL 2 BASE: Performed by: PODIATRIST

## 2023-06-30 PROCEDURE — 2580000003 HC RX 258: Performed by: NURSE ANESTHETIST, CERTIFIED REGISTERED

## 2023-06-30 PROCEDURE — 3700000001 HC ADD 15 MINUTES (ANESTHESIA): Performed by: PODIATRIST

## 2023-06-30 PROCEDURE — 6360000002 HC RX W HCPCS: Performed by: PODIATRIST

## 2023-06-30 PROCEDURE — 2720000010 HC SURG SUPPLY STERILE: Performed by: PODIATRIST

## 2023-06-30 PROCEDURE — 7100000001 HC PACU RECOVERY - ADDTL 15 MIN: Performed by: PODIATRIST

## 2023-06-30 PROCEDURE — 64447 NJX AA&/STRD FEMORAL NRV IMG: CPT | Performed by: ANESTHESIOLOGY

## 2023-06-30 PROCEDURE — 6360000002 HC RX W HCPCS: Performed by: NURSE ANESTHETIST, CERTIFIED REGISTERED

## 2023-06-30 PROCEDURE — 2709999900 HC NON-CHARGEABLE SUPPLY: Performed by: PODIATRIST

## 2023-06-30 PROCEDURE — 3600000012 HC SURGERY LEVEL 2 ADDTL 15MIN: Performed by: PODIATRIST

## 2023-06-30 PROCEDURE — 7100000011 HC PHASE II RECOVERY - ADDTL 15 MIN: Performed by: PODIATRIST

## 2023-06-30 PROCEDURE — 2500000003 HC RX 250 WO HCPCS: Performed by: NURSE ANESTHETIST, CERTIFIED REGISTERED

## 2023-06-30 PROCEDURE — 7100000010 HC PHASE II RECOVERY - FIRST 15 MIN: Performed by: PODIATRIST

## 2023-06-30 PROCEDURE — 6370000000 HC RX 637 (ALT 250 FOR IP): Performed by: NURSE ANESTHETIST, CERTIFIED REGISTERED

## 2023-06-30 DEVICE — LOCKING SCREWS
Type: IMPLANTABLE DEVICE | Site: FOOT | Status: FUNCTIONAL
Brand: FASTPITCH 2.7MM HIGH PITCH LOCKING SCREW

## 2023-06-30 DEVICE — ANATOMIC TENSION SIDE PLATE
Type: IMPLANTABLE DEVICE | Site: FOOT | Status: FUNCTIONAL
Brand: PLANTARPOWER PLATE

## 2023-06-30 DEVICE — ANATOMIC BIPLANAR IMPLANTS
Type: IMPLANTABLE DEVICE | Site: FOOT | Status: FUNCTIONAL
Brand: LAPIPLASTY SYSTEM 2

## 2023-06-30 DEVICE — 2.7MM HIGH PITCH LOCKING SCREW - 12MM/14MM
Type: IMPLANTABLE DEVICE | Site: FOOT | Status: FUNCTIONAL
Brand: FASTPITCH

## 2023-06-30 RX ORDER — PROCHLORPERAZINE EDISYLATE 5 MG/ML
5 INJECTION INTRAMUSCULAR; INTRAVENOUS
Status: DISCONTINUED | OUTPATIENT
Start: 2023-06-30 | End: 2023-06-30 | Stop reason: HOSPADM

## 2023-06-30 RX ORDER — ACETAMINOPHEN 325 MG/1
650 TABLET ORAL
Status: DISCONTINUED | OUTPATIENT
Start: 2023-06-30 | End: 2023-06-30 | Stop reason: HOSPADM

## 2023-06-30 RX ORDER — FENTANYL CITRATE 50 UG/ML
100 INJECTION, SOLUTION INTRAMUSCULAR; INTRAVENOUS
Status: COMPLETED | OUTPATIENT
Start: 2023-06-30 | End: 2023-06-30

## 2023-06-30 RX ORDER — KETOROLAC TROMETHAMINE 15 MG/ML
INJECTION, SOLUTION INTRAMUSCULAR; INTRAVENOUS PRN
Status: DISCONTINUED | OUTPATIENT
Start: 2023-06-30 | End: 2023-06-30 | Stop reason: SDUPTHER

## 2023-06-30 RX ORDER — DIPHENHYDRAMINE HYDROCHLORIDE 50 MG/ML
12.5 INJECTION INTRAMUSCULAR; INTRAVENOUS
Status: DISCONTINUED | OUTPATIENT
Start: 2023-06-30 | End: 2023-06-30 | Stop reason: HOSPADM

## 2023-06-30 RX ORDER — ATORVASTATIN CALCIUM 10 MG/1
10 TABLET, FILM COATED ORAL
COMMUNITY
Start: 2022-02-23

## 2023-06-30 RX ORDER — ROPIVACAINE HYDROCHLORIDE 5 MG/ML
30 INJECTION, SOLUTION EPIDURAL; INFILTRATION; PERINEURAL ONCE
Status: DISCONTINUED | OUTPATIENT
Start: 2023-06-30 | End: 2023-06-30 | Stop reason: HOSPADM

## 2023-06-30 RX ORDER — FAMOTIDINE 20 MG/1
20 TABLET, FILM COATED ORAL ONCE
Status: COMPLETED | OUTPATIENT
Start: 2023-06-30 | End: 2023-06-30

## 2023-06-30 RX ORDER — LIDOCAINE HYDROCHLORIDE 20 MG/ML
INJECTION, SOLUTION EPIDURAL; INFILTRATION; INTRACAUDAL; PERINEURAL PRN
Status: DISCONTINUED | OUTPATIENT
Start: 2023-06-30 | End: 2023-06-30 | Stop reason: SDUPTHER

## 2023-06-30 RX ORDER — SODIUM CHLORIDE, SODIUM LACTATE, POTASSIUM CHLORIDE, CALCIUM CHLORIDE 600; 310; 30; 20 MG/100ML; MG/100ML; MG/100ML; MG/100ML
INJECTION, SOLUTION INTRAVENOUS CONTINUOUS
Status: DISCONTINUED | OUTPATIENT
Start: 2023-06-30 | End: 2023-06-30 | Stop reason: HOSPADM

## 2023-06-30 RX ORDER — CRANBERRY FRUIT EXTRACT 650 MG
CAPSULE ORAL
COMMUNITY
Start: 2022-06-01

## 2023-06-30 RX ORDER — BACITRACIN ZINC 500 [USP'U]/G
OINTMENT TOPICAL PRN
Status: DISCONTINUED | OUTPATIENT
Start: 2023-06-30 | End: 2023-06-30 | Stop reason: ALTCHOICE

## 2023-06-30 RX ORDER — PROPOFOL 10 MG/ML
INJECTION, EMULSION INTRAVENOUS PRN
Status: DISCONTINUED | OUTPATIENT
Start: 2023-06-30 | End: 2023-06-30 | Stop reason: SDUPTHER

## 2023-06-30 RX ORDER — EPHEDRINE SULFATE/0.9% NACL/PF 50 MG/5 ML
SYRINGE (ML) INTRAVENOUS PRN
Status: DISCONTINUED | OUTPATIENT
Start: 2023-06-30 | End: 2023-06-30 | Stop reason: SDUPTHER

## 2023-06-30 RX ORDER — LIDOCAINE HYDROCHLORIDE 10 MG/ML
1 INJECTION, SOLUTION EPIDURAL; INFILTRATION; INTRACAUDAL; PERINEURAL
Status: COMPLETED | OUTPATIENT
Start: 2023-06-30 | End: 2023-06-30

## 2023-06-30 RX ORDER — MIDAZOLAM HYDROCHLORIDE 2 MG/2ML
2 INJECTION, SOLUTION INTRAMUSCULAR; INTRAVENOUS
Status: COMPLETED | OUTPATIENT
Start: 2023-06-30 | End: 2023-06-30

## 2023-06-30 RX ORDER — SERTRALINE HYDROCHLORIDE 100 MG/1
100 TABLET, FILM COATED ORAL DAILY
COMMUNITY
Start: 2023-05-26

## 2023-06-30 RX ORDER — FENTANYL CITRATE 50 UG/ML
INJECTION, SOLUTION INTRAMUSCULAR; INTRAVENOUS PRN
Status: DISCONTINUED | OUTPATIENT
Start: 2023-06-30 | End: 2023-06-30 | Stop reason: SDUPTHER

## 2023-06-30 RX ORDER — ONDANSETRON 2 MG/ML
INJECTION INTRAMUSCULAR; INTRAVENOUS PRN
Status: DISCONTINUED | OUTPATIENT
Start: 2023-06-30 | End: 2023-06-30 | Stop reason: SDUPTHER

## 2023-06-30 RX ORDER — SODIUM CHLORIDE 0.9 % (FLUSH) 0.9 %
5-40 SYRINGE (ML) INJECTION EVERY 12 HOURS SCHEDULED
Status: DISCONTINUED | OUTPATIENT
Start: 2023-06-30 | End: 2023-06-30 | Stop reason: HOSPADM

## 2023-06-30 RX ORDER — ONDANSETRON 2 MG/ML
4 INJECTION INTRAMUSCULAR; INTRAVENOUS
Status: DISCONTINUED | OUTPATIENT
Start: 2023-06-30 | End: 2023-06-30 | Stop reason: HOSPADM

## 2023-06-30 RX ORDER — DEXAMETHASONE SODIUM PHOSPHATE 4 MG/ML
INJECTION, SOLUTION INTRA-ARTICULAR; INTRALESIONAL; INTRAMUSCULAR; INTRAVENOUS; SOFT TISSUE PRN
Status: DISCONTINUED | OUTPATIENT
Start: 2023-06-30 | End: 2023-06-30 | Stop reason: SDUPTHER

## 2023-06-30 RX ORDER — SODIUM CHLORIDE 0.9 % (FLUSH) 0.9 %
5-40 SYRINGE (ML) INJECTION PRN
Status: DISCONTINUED | OUTPATIENT
Start: 2023-06-30 | End: 2023-06-30 | Stop reason: HOSPADM

## 2023-06-30 RX ORDER — SODIUM CHLORIDE 9 MG/ML
INJECTION, SOLUTION INTRAVENOUS PRN
Status: DISCONTINUED | OUTPATIENT
Start: 2023-06-30 | End: 2023-06-30 | Stop reason: HOSPADM

## 2023-06-30 RX ORDER — FENTANYL CITRATE 50 UG/ML
25 INJECTION, SOLUTION INTRAMUSCULAR; INTRAVENOUS EVERY 5 MIN PRN
Status: DISCONTINUED | OUTPATIENT
Start: 2023-06-30 | End: 2023-06-30 | Stop reason: HOSPADM

## 2023-06-30 RX ADMIN — LIDOCAINE HYDROCHLORIDE 60 MG: 20 INJECTION, SOLUTION EPIDURAL; INFILTRATION; INTRACAUDAL; PERINEURAL at 07:28

## 2023-06-30 RX ADMIN — Medication 5 MG: at 07:38

## 2023-06-30 RX ADMIN — FENTANYL CITRATE 50 MCG: 50 INJECTION, SOLUTION INTRAMUSCULAR; INTRAVENOUS at 07:09

## 2023-06-30 RX ADMIN — ROPIVACAINE HYDROCHLORIDE 30 ML: 5 INJECTION, SOLUTION EPIDURAL; INFILTRATION; PERINEURAL at 07:07

## 2023-06-30 RX ADMIN — SODIUM CHLORIDE, SODIUM LACTATE, POTASSIUM CHLORIDE, AND CALCIUM CHLORIDE: 600; 310; 30; 20 INJECTION, SOLUTION INTRAVENOUS at 07:18

## 2023-06-30 RX ADMIN — Medication 5 MG: at 07:40

## 2023-06-30 RX ADMIN — ONDANSETRON 4 MG: 2 INJECTION INTRAMUSCULAR; INTRAVENOUS at 09:09

## 2023-06-30 RX ADMIN — MIDAZOLAM 2 MG: 1 INJECTION INTRAMUSCULAR; INTRAVENOUS at 07:09

## 2023-06-30 RX ADMIN — SODIUM CHLORIDE, SODIUM LACTATE, POTASSIUM CHLORIDE, AND CALCIUM CHLORIDE: 600; 310; 30; 20 INJECTION, SOLUTION INTRAVENOUS at 08:52

## 2023-06-30 RX ADMIN — FENTANYL CITRATE 25 MCG: 50 INJECTION INTRAMUSCULAR; INTRAVENOUS at 09:07

## 2023-06-30 RX ADMIN — WATER 2000 MG: 1 INJECTION, SOLUTION INTRAMUSCULAR; INTRAVENOUS; SUBCUTANEOUS at 07:35

## 2023-06-30 RX ADMIN — PROPOFOL 150 MG: 10 INJECTION, EMULSION INTRAVENOUS at 07:28

## 2023-06-30 RX ADMIN — LIDOCAINE HYDROCHLORIDE 1 ML: 10 INJECTION, SOLUTION EPIDURAL; INFILTRATION; INTRACAUDAL; PERINEURAL at 07:08

## 2023-06-30 RX ADMIN — FAMOTIDINE 20 MG: 20 TABLET, FILM COATED ORAL at 06:39

## 2023-06-30 RX ADMIN — FENTANYL CITRATE 25 MCG: 50 INJECTION INTRAMUSCULAR; INTRAVENOUS at 07:29

## 2023-06-30 RX ADMIN — DEXAMETHASONE SODIUM PHOSPHATE 4 MG: 4 INJECTION, SOLUTION INTRAMUSCULAR; INTRAVENOUS at 07:42

## 2023-06-30 RX ADMIN — PROPOFOL 50 MG: 10 INJECTION, EMULSION INTRAVENOUS at 07:29

## 2023-06-30 RX ADMIN — KETOROLAC TROMETHAMINE 15 MG: 15 INJECTION, SOLUTION INTRAMUSCULAR; INTRAVENOUS at 09:09

## 2023-06-30 ASSESSMENT — PAIN SCALES - GENERAL
PAINLEVEL_OUTOF10: 0

## 2023-06-30 ASSESSMENT — PAIN - FUNCTIONAL ASSESSMENT: PAIN_FUNCTIONAL_ASSESSMENT: 0-10

## 2023-10-12 ENCOUNTER — HOSPITAL ENCOUNTER (OUTPATIENT)
Facility: HOSPITAL | Age: 58
Setting detail: RECURRING SERIES
Discharge: HOME | End: 2023-10-15
Payer: COMMERCIAL

## 2023-10-12 PROCEDURE — 97110 THERAPEUTIC EXERCISES: CPT

## 2023-10-12 PROCEDURE — 97140 MANUAL THERAPY 1/> REGIONS: CPT

## 2023-10-12 PROCEDURE — 97162 PT EVAL MOD COMPLEX 30 MIN: CPT

## 2023-10-12 NOTE — PROGRESS NOTES
[address] [facility] [address] [facility] XT:[phone] Fx: [phone]    PLAN OF CARE/ Statement of Necessity for Physical Therapy Services           Patient name: Abida Myers Start of Care: 10/12/2023   Referral source: Mehul Padilla DPM : 1965    Medical Diagnosis: Pain in right foot [M79.671]       Onset Date: 2023   Treatment Diagnosis: M79.671  RIGHT FOOT PAIN                                      Prior Hospitalization: see medical history Provider#: [id number]   Medications: Verified on Patient Summary List     Comorbidities: hx right ankle fx, Asthma  Prior Level of Function: The patient states she had pain with walking, but was able to perform all ADLs prior to onset. The Plan of Care and following information is based on the information from the initial evaluation. Assessment / key information:  The patient is a 62year old female s/p left foot lapiplasty performed on 2023. She denies complications and states her surgery went quite well, but states she has continued to have chiefly swelling and aching of her right foot. She presents in [facility] footwear today and states that she is having difficulty tolerating anything touching the dorsum of her right foot. She does have pain with weight bearing and states she has been weight bearing more through the outer portion of her foot. Upon examination she does presents with hypersensitivity to light touch of her foot around her proximal incision which is healed well, void of any signs of infection and has attained good cosmesis. She presents with impairments consisting of pain, decreased ROM, decreased flexibility, decreased strength, decreased ambulation efficiency, decreased ease of ADLs, and decreased quality of life. The patient will benefit from skilled PT in order to address the aforementioned impairments.      Evaluation Complexity:  History:  [facility]   10/26/2023 11:50 AM Luis Pacheco, PT [facility]   10/31/2023 11:10 AM Marija Gann, PT [facility]   11/3/2023  7:10 AM Luis Pacheco, PT [facility]   11/8/2023  7:10 AM Luis Pacheco, PT [facility]   11/10/2023  7:10 AM Luis Pacheco, PT [facility]   11/15/2023  7:10 AM Luis Pacheco, PT [facility]   11/17/2023  7:10 AM Luis Pacheco, PT [facility]   11/22/2023  7:10 AM Marija Gann, PT [facility]

## 2023-10-17 ENCOUNTER — APPOINTMENT (OUTPATIENT)
Facility: HOSPITAL | Age: 58
End: 2023-10-17
Payer: COMMERCIAL

## 2023-10-20 ENCOUNTER — HOSPITAL ENCOUNTER (OUTPATIENT)
Facility: HOSPITAL | Age: 58
Setting detail: RECURRING SERIES
Discharge: HOME | End: 2023-10-23
Payer: COMMERCIAL

## 2023-10-20 PROCEDURE — 97110 THERAPEUTIC EXERCISES: CPT

## 2023-10-20 PROCEDURE — 97140 MANUAL THERAPY 1/> REGIONS: CPT

## 2023-10-20 NOTE — PROGRESS NOTES
PHYSICAL / OCCUPATIONAL THERAPY - DAILY TREATMENT NOTE (updated )    Patient Name: Osito Hurtado    Date: 10/20/2023    : 1965  Insurance: Payor: Jorge Alberto Cover / Plan: [place] [employer] / Product Type: *No Product type* /      Patient  verified Yes     Visit #   Current / Total 2 12   Time   In / Out 7:15 8:06   Pain   In / Out 8/10 6.5/10   Subjective Functional Status/Changes: The patient reports continued pain of her right foot. She reports compliance of the gastroc stretching and desensitization work. Changes to: Allergies, Med Hx, Sx Hx?   no       TREATMENT AREA =  Pain in right foot [M79.671]    OBJECTIVE    Modalities Rationale:     decrease edema, decrease inflammation, and decrease pain to improve patient's ability to progress to PLOF and address remaining functional goals. 10 min [x]  Vasopneumatic Device, press/temp:  LP/LT pt seated right ankle   Skin assessment post-treatment (if applicable):    []  intact    []  redness- no adverse reaction                 []redness - adverse reaction:         Therapeutic Procedures: Tx Min Billable or 1:1 Min (if diff from Tx Min) Procedure, Rationale, Specifics   26  69769 Therapeutic Exercise (timed):  increase ROM, strength, coordination, balance, and proprioception to improve patient's ability to progress to PLOF and address remaining functional goals. (see flow sheet as applicable)    Details if applicable:       15  20191 Manual Therapy (timed):  decrease pain, increase ROM, and increase tissue extensibility to improve patient's ability to progress to PLOF and address remaining functional goals. The manual therapy interventions were performed at a separate and distinct time from the therapeutic activities interventions . Details: desensitization techniques of talus and medial cuneiform, talocrural mobs DF emphasis with subtalar inversion/eversion.        Details if applicable:  Gastroc/soleus MFR performed during gastroc stretching

## 2023-10-24 ENCOUNTER — HOSPITAL ENCOUNTER (OUTPATIENT)
Facility: HOSPITAL | Age: 58
Setting detail: RECURRING SERIES
Discharge: HOME | End: 2023-10-27
Payer: COMMERCIAL

## 2023-10-24 NOTE — PROGRESS NOTES
PHYSICAL / OCCUPATIONAL THERAPY - DAILY TREATMENT NOTE (updated )    Patient Name: Matt Narayanan    Date: 10/24/2023    : 1965  Insurance: Payor: Olvin Sessions / Plan: Alona Herrerater / Product Type: *No Product type* /      Patient  verified Yes     Visit #   Current / Total 3 12   Time   In / Out 8:22 8:35   Pain   In / Out 7.5/10 7.5/10   Subjective Functional Status/Changes: The patient reports that her pain was a lot worse over the weekend and the pain went all the way up into her thigh. She states the morning of the treatment, it wasn't too bad, but she noticed a gradual pain increase into the afternoon, attempted to attend an event that evening and had to leave early. She describes pain as shooting, is in her foot, and does go up into her calf and even the back of her thigh. Changes to: Allergies, Med Hx, Sx Hx?   no       TREATMENT AREA =  Pain in right foot [M79.671]    The patient presented to PT today with increased pain, and could barely tolerate resting the back of her leg on wedge in supine (even without her foot articulating any surface). Figure 8 measure was taken to assess for increased edema, though B ankles were noted to be equal today at 51.0 cm (a decrease from IE). Due to the patient's increased pain and inability to tolerate even a resting position, it was decided by PT treatment would be held today and the patient was instructed to hold HEP at this time. PT to have discussion with referring MD and we would be in touch. The patient agreed, thanked PT and left with all questions answered and appreciative her concerns were heard. PLAN  Yes  Continue plan of care  []  Upgrade activities as tolerated  []  Discharge due to :  [x]  Other: Hold PT at this time until MD is informed regarding pt's response.      Burke Eddy, PT    10/24/2023    8:27 AM    Future Appointments   Date Time Provider [address]   10/24/2023  8:30 AM Madison Sanchez

## 2023-10-26 ENCOUNTER — APPOINTMENT (OUTPATIENT)
Facility: HOSPITAL | Age: 58
End: 2023-10-26
Payer: COMMERCIAL

## 2023-10-31 ENCOUNTER — APPOINTMENT (OUTPATIENT)
Facility: HOSPITAL | Age: 58
End: 2023-10-31
Payer: COMMERCIAL

## 2023-11-07 ENCOUNTER — TELEPHONE (OUTPATIENT)
Facility: HOSPITAL | Age: 58
End: 2023-11-07

## 2023-12-12 ENCOUNTER — HOSPITAL ENCOUNTER (OUTPATIENT)
Facility: HOSPITAL | Age: 58
Discharge: HOME | End: 2023-12-15
Attending: PODIATRIST
Payer: COMMERCIAL

## 2023-12-12 DIAGNOSIS — M79.671 RIGHT FOOT PAIN: ICD-10-CM

## 2023-12-12 DIAGNOSIS — S82.891S FRACTURE OF ANKLE, RIGHT, CLOSED, SEQUELA: ICD-10-CM

## 2023-12-12 PROCEDURE — 73700 CT LOWER EXTREMITY W/O DYE: CPT

## 2024-01-24 ENCOUNTER — OFFICE VISIT (OUTPATIENT)
Age: 59
End: 2024-01-24
Payer: COMMERCIAL

## 2024-01-24 VITALS
HEART RATE: 85 BPM | WEIGHT: 157 LBS | HEIGHT: 66 IN | SYSTOLIC BLOOD PRESSURE: 114 MMHG | TEMPERATURE: 97.3 F | DIASTOLIC BLOOD PRESSURE: 67 MMHG | BODY MASS INDEX: 25.23 KG/M2 | OXYGEN SATURATION: 97 % | RESPIRATION RATE: 16 BRPM

## 2024-01-24 DIAGNOSIS — R05.3 CHRONIC COUGH: ICD-10-CM

## 2024-01-24 DIAGNOSIS — M79.10 MUSCULAR PAIN: ICD-10-CM

## 2024-01-24 DIAGNOSIS — M25.551 PAIN OF BOTH HIP JOINTS: ICD-10-CM

## 2024-01-24 DIAGNOSIS — M25.552 PAIN OF BOTH HIP JOINTS: ICD-10-CM

## 2024-01-24 DIAGNOSIS — R06.02 EXERTIONAL SHORTNESS OF BREATH: ICD-10-CM

## 2024-01-24 DIAGNOSIS — R07.89 OTHER CHEST PAIN: Primary | ICD-10-CM

## 2024-01-24 DIAGNOSIS — E78.00 HYPERCHOLESTEREMIA: ICD-10-CM

## 2024-01-24 PROCEDURE — 99204 OFFICE O/P NEW MOD 45 MIN: CPT | Performed by: INTERNAL MEDICINE

## 2024-01-24 RX ORDER — ATORVASTATIN CALCIUM 40 MG/1
40 TABLET, FILM COATED ORAL DAILY
COMMUNITY

## 2024-01-24 RX ORDER — SERTRALINE HYDROCHLORIDE 100 MG/1
100 TABLET, FILM COATED ORAL DAILY
COMMUNITY

## 2024-01-24 ASSESSMENT — ENCOUNTER SYMPTOMS
GASTROINTESTINAL NEGATIVE: 1
EYES NEGATIVE: 1
ALLERGIC/IMMUNOLOGIC NEGATIVE: 1
SHORTNESS OF BREATH: 1

## 2024-01-24 NOTE — PROGRESS NOTES
New patient  Jessica Fernández (:  1965) is a 58 y.o. female,New patient, here for evaluation of the following chief complaint(s):  Chest Pain      Subjective   SUBJECTIVE/OBJECTIVE:  Chest Pain   Associated symptoms include palpitations and shortness of breath.     Patient presents today for evaluation of chest pain.  This is intermittent and not following any particular pattern.  This can occur at rest or with activity.  She does note some shortness of breath and recently experienced a viral syndrome with residual cough without excessive sputum production.  The chest discomfort has recurred previously, but has worsened somewhat over the last month or so.  Patient also has various arthralgias and muscle pain throughout her extremities.  There is some focus on the hips bilaterally.  No history of connective tissue disorder.  She has for which she is on Lipitor.  No history of known coronary disease previously no evidence of heart failure.  Her shortness of breath is present but stable and of unclear origin she has no history of hypertension or diabetes.  She is a non-smoker.  I was asked to evaluate her cardiac status and make recommendations    I have carefully reviewed all available medical records, previous office notes, lab, x-ray and procedure reports    Past Medical History:   Diagnosis Date    Avulsion fracture of ankle     right lat malleolus    Diffuse cystic mastopathy     Herpes progenitalis     SULEIMAN (obstructive sleep apnea)     uses a mouth piece    Thromboembolus (HCC) 14    right leg    Vertigo 9-4-14.         Past Surgical History:   Procedure Laterality Date    APPENDECTOMY      BUNIONECTOMY Right 2023    RIGHT LAPIPLASTY, AKIN RIGHT FOOT; MINI C-ARM; ORTHOFIX; NERVE BLOCK performed by Emelia Solomon DPM at [facility] MAIN OR     SECTION, CLASSIC      COLONOSCOPY      CT BONE MARROW BIOPSY  10/21/2019    CT BONE MARROW BIOPSY 10/21/2019 [facility] HISTORICAL    DELIVERY

## 2024-01-30 LAB
ALBUMIN SERPL-MCNC: 4.1 G/DL (ref 3.8–4.9)
ALBUMIN/GLOB SERPL: 2.1 {RATIO} (ref 1.2–2.2)
ALP SERPL-CCNC: 91 IU/L (ref 44–121)
ALT SERPL-CCNC: 15 IU/L (ref 0–32)
ANA HOMOGEN TITR SER: ABNORMAL {TITER}
ANA SER QL IF: POSITIVE
AST SERPL-CCNC: 17 IU/L (ref 0–40)
BILIRUB SERPL-MCNC: 0.3 MG/DL (ref 0–1.2)
BUN SERPL-MCNC: 13 MG/DL (ref 6–24)
BUN/CREAT SERPL: 18 (ref 9–23)
CALCIUM SERPL-MCNC: 9.2 MG/DL (ref 8.7–10.2)
CENTROMERE B AB SER-ACNC: <0.2 AI (ref 0–0.9)
CHLORIDE SERPL-SCNC: 111 MMOL/L (ref 96–106)
CHOLEST SERPL-MCNC: 206 MG/DL (ref 100–199)
CHROMATIN AB SERPL-ACNC: <0.2 AI (ref 0–0.9)
CK SERPL-CCNC: 65 U/L (ref 32–182)
CO2 SERPL-SCNC: 21 MMOL/L (ref 20–29)
CREAT SERPL-MCNC: 0.73 MG/DL (ref 0.57–1)
CRP SERPL-MCNC: 4 MG/L (ref 0–10)
DSDNA AB SER-ACNC: 1 IU/ML (ref 0–9)
EGFRCR SERPLBLD CKD-EPI 2021: 95 ML/MIN/1.73
ENA JO1 AB SER-ACNC: <0.2 AI (ref 0–0.9)
ENA RNP AB SER-ACNC: <0.2 AI (ref 0–0.9)
ENA SCL70 AB SER-ACNC: 0.2 AI (ref 0–0.9)
ENA SM AB SER-ACNC: <0.2 AI (ref 0–0.9)
ENA SS-A AB SER-ACNC: <0.2 AI (ref 0–0.9)
ENA SS-B AB SER-ACNC: <0.2 AI (ref 0–0.9)
ERYTHROCYTE [SEDIMENTATION RATE] IN BLOOD BY WESTERGREN METHOD: 28 MM/HR (ref 0–40)
GLOBULIN SER CALC-MCNC: 2 G/DL (ref 1.5–4.5)
GLUCOSE SERPL-MCNC: 94 MG/DL (ref 70–99)
HDLC SERPL-MCNC: 49 MG/DL
LABORATORY COMMENT REPORT: ABNORMAL
LDLC SERPL CALC-MCNC: 140 MG/DL (ref 0–99)
Lab: ABNORMAL
Lab: ABNORMAL
POTASSIUM SERPL-SCNC: 4.5 MMOL/L (ref 3.5–5.2)
PROT SERPL-MCNC: 6.1 G/DL (ref 6–8.5)
SODIUM SERPL-SCNC: 148 MMOL/L (ref 134–144)
SPECIMEN STATUS REPORT: NORMAL
T4 FREE SERPL-MCNC: 1.07 NG/DL (ref 0.82–1.77)
TRIGL SERPL-MCNC: 92 MG/DL (ref 0–149)
TSH SERPL DL<=0.005 MIU/L-ACNC: 1.97 UIU/ML (ref 0.45–4.5)
VLDLC SERPL CALC-MCNC: 17 MG/DL (ref 5–40)

## 2024-03-01 ENCOUNTER — OFFICE VISIT (OUTPATIENT)
Facility: CLINIC | Age: 59
End: 2024-03-01
Payer: COMMERCIAL

## 2024-03-01 VITALS
WEIGHT: 158 LBS | DIASTOLIC BLOOD PRESSURE: 71 MMHG | BODY MASS INDEX: 25.39 KG/M2 | SYSTOLIC BLOOD PRESSURE: 109 MMHG | HEIGHT: 66 IN | OXYGEN SATURATION: 99 % | TEMPERATURE: 97.5 F | HEART RATE: 86 BPM | RESPIRATION RATE: 17 BRPM

## 2024-03-01 DIAGNOSIS — Z12.31 SCREENING MAMMOGRAM FOR BREAST CANCER: ICD-10-CM

## 2024-03-01 DIAGNOSIS — R76.8 ANA POSITIVE: Primary | ICD-10-CM

## 2024-03-01 DIAGNOSIS — M25.50 CHRONIC PAIN OF MULTIPLE JOINTS: ICD-10-CM

## 2024-03-01 DIAGNOSIS — G89.29 CHRONIC PAIN OF MULTIPLE JOINTS: ICD-10-CM

## 2024-03-01 DIAGNOSIS — Z12.11 SCREENING FOR COLON CANCER: ICD-10-CM

## 2024-03-01 PROBLEM — D64.9 ANEMIA: Status: ACTIVE | Noted: 2023-03-08

## 2024-03-01 PROBLEM — N39.0 URINARY TRACT INFECTIOUS DISEASE: Status: ACTIVE | Noted: 2024-03-01

## 2024-03-01 PROBLEM — J30.9 ALLERGIC RHINITIS: Status: ACTIVE | Noted: 2021-10-07

## 2024-03-01 PROBLEM — N94.6 DYSMENORRHEA: Status: ACTIVE | Noted: 2024-03-01

## 2024-03-01 PROBLEM — N92.1 IRREGULAR INTERMENSTRUAL BLEEDING: Status: ACTIVE | Noted: 2024-03-01

## 2024-03-01 PROBLEM — F41.9 SEVERE ANXIETY: Status: ACTIVE | Noted: 2021-10-07

## 2024-03-01 PROBLEM — R39.15 URINARY URGENCY: Status: ACTIVE | Noted: 2024-03-01

## 2024-03-01 PROBLEM — N94.9 FEMALE GENITAL SYMPTOMS: Status: ACTIVE | Noted: 2024-03-01

## 2024-03-01 PROBLEM — R19.00 PELVIC SWELLING: Status: ACTIVE | Noted: 2024-03-01

## 2024-03-01 PROBLEM — D50.0 ANEMIA DUE TO CHRONIC BLOOD LOSS: Status: ACTIVE | Noted: 2024-03-01

## 2024-03-01 PROBLEM — E78.5 HYPERLIPIDEMIA: Status: ACTIVE | Noted: 2023-03-08

## 2024-03-01 PROBLEM — I82.409 DEEP VEIN THROMBOSIS (DVT) OF LOWER EXTREMITY (HCC): Status: ACTIVE | Noted: 2024-03-01

## 2024-03-01 PROBLEM — F41.9 ANXIETY: Status: ACTIVE | Noted: 2023-03-08

## 2024-03-01 PROBLEM — R23.3 BRUISES EASILY: Status: ACTIVE | Noted: 2024-03-01

## 2024-03-01 PROBLEM — N89.8 LEUKORRHEA: Status: ACTIVE | Noted: 2024-03-01

## 2024-03-01 PROBLEM — R35.0 INCREASED FREQUENCY OF URINATION: Status: ACTIVE | Noted: 2024-03-01

## 2024-03-01 PROBLEM — R15.1 FECAL SMEARING: Status: ACTIVE | Noted: 2022-12-06

## 2024-03-01 PROBLEM — F32.2 SEVERE DEPRESSION (HCC): Status: ACTIVE | Noted: 2021-10-07

## 2024-03-01 PROCEDURE — 99213 OFFICE O/P EST LOW 20 MIN: CPT | Performed by: FAMILY MEDICINE

## 2024-03-01 SDOH — ECONOMIC STABILITY: FOOD INSECURITY: WITHIN THE PAST 12 MONTHS, YOU WORRIED THAT YOUR FOOD WOULD RUN OUT BEFORE YOU GOT MONEY TO BUY MORE.: NEVER TRUE

## 2024-03-01 SDOH — ECONOMIC STABILITY: FOOD INSECURITY: WITHIN THE PAST 12 MONTHS, THE FOOD YOU BOUGHT JUST DIDN'T LAST AND YOU DIDN'T HAVE MONEY TO GET MORE.: NEVER TRUE

## 2024-03-01 SDOH — ECONOMIC STABILITY: INCOME INSECURITY: HOW HARD IS IT FOR YOU TO PAY FOR THE VERY BASICS LIKE FOOD, HOUSING, MEDICAL CARE, AND HEATING?: NOT HARD AT ALL

## 2024-03-01 SDOH — ECONOMIC STABILITY: HOUSING INSECURITY
IN THE LAST 12 MONTHS, WAS THERE A TIME WHEN YOU DID NOT HAVE A STEADY PLACE TO SLEEP OR SLEPT IN A SHELTER (INCLUDING NOW)?: NO

## 2024-03-01 ASSESSMENT — PATIENT HEALTH QUESTIONNAIRE - PHQ9
6. FEELING BAD ABOUT YOURSELF - OR THAT YOU ARE A FAILURE OR HAVE LET YOURSELF OR YOUR FAMILY DOWN: 0
SUM OF ALL RESPONSES TO PHQ QUESTIONS 1-9: 4
SUM OF ALL RESPONSES TO PHQ QUESTIONS 1-9: 4
7. TROUBLE CONCENTRATING ON THINGS, SUCH AS READING THE NEWSPAPER OR WATCHING TELEVISION: 0
4. FEELING TIRED OR HAVING LITTLE ENERGY: 3
SUM OF ALL RESPONSES TO PHQ9 QUESTIONS 1 & 2: 0
SUM OF ALL RESPONSES TO PHQ QUESTIONS 1-9: 0
SUM OF ALL RESPONSES TO PHQ9 QUESTIONS 1 & 2: 0
9. THOUGHTS THAT YOU WOULD BE BETTER OFF DEAD, OR OF HURTING YOURSELF: 0
1. LITTLE INTEREST OR PLEASURE IN DOING THINGS: 0
2. FEELING DOWN, DEPRESSED OR HOPELESS: 0
SUM OF ALL RESPONSES TO PHQ QUESTIONS 1-9: 0
SUM OF ALL RESPONSES TO PHQ QUESTIONS 1-9: 4
3. TROUBLE FALLING OR STAYING ASLEEP: 1
SUM OF ALL RESPONSES TO PHQ QUESTIONS 1-9: 0
2. FEELING DOWN, DEPRESSED OR HOPELESS: 0
8. MOVING OR SPEAKING SO SLOWLY THAT OTHER PEOPLE COULD HAVE NOTICED. OR THE OPPOSITE, BEING SO FIGETY OR RESTLESS THAT YOU HAVE BEEN MOVING AROUND A LOT MORE THAN USUAL: 0
5. POOR APPETITE OR OVEREATING: 0
10. IF YOU CHECKED OFF ANY PROBLEMS, HOW DIFFICULT HAVE THESE PROBLEMS MADE IT FOR YOU TO DO YOUR WORK, TAKE CARE OF THINGS AT HOME, OR GET ALONG WITH OTHER PEOPLE: 0
SUM OF ALL RESPONSES TO PHQ QUESTIONS 1-9: 4
SUM OF ALL RESPONSES TO PHQ QUESTIONS 1-9: 0

## 2024-03-01 NOTE — PROGRESS NOTES
arthralgias and back pain.   Neurological:  Positive for headaches (on occasions.   Wears CPAP machine at night). Negative for dizziness.          Objective  /71 (Site: Right Upper Arm, Position: Sitting, Cuff Size: Small Adult)   Pulse 86   Temp 97.5 °F (36.4 °C) (Temporal)   Resp 17   Ht 1.676 m (5' 6\")   Wt 71.7 kg (158 lb)   SpO2 99%   BMI 25.50 kg/m²     Physical Exam  Vitals and nursing note reviewed.   Constitutional:       Appearance: Normal appearance.   HENT:      Head: Normocephalic and atraumatic.   Eyes:      Extraocular Movements: Extraocular movements intact.   Cardiovascular:      Rate and Rhythm: Normal rate and regular rhythm.   Pulmonary:      Effort: Pulmonary effort is normal.      Breath sounds: Normal breath sounds.   Musculoskeletal:      Right lower leg: No edema.      Left lower leg: No edema.   Skin:     General: Skin is warm and dry.   Neurological:      Mental Status: She is alert and oriented to person, place, and time.                  An electronic signature was used to authenticate this note.    --Brittaney Braswell MD

## 2024-05-15 ENCOUNTER — PATIENT MESSAGE (OUTPATIENT)
Facility: CLINIC | Age: 59
End: 2024-05-15

## 2024-05-16 ENCOUNTER — PATIENT MESSAGE (OUTPATIENT)
Facility: CLINIC | Age: 59
End: 2024-05-16

## 2024-05-16 NOTE — TELEPHONE ENCOUNTER
From: Jessica Fernández  To: Dr. Brittaney Braswell  Sent: 5/16/2024 8:48 AM EDT  Subject: this is the medication i am referring to in the previous email    sertraline 100 MG tablet    Commonly known as: ZOLOFT  Learn more  Take 1 tablet by mouth daily  This [facility] at this [facility].    Prescription Details  Started takingMay 26, 2023  Documented byBAYLEE TOVAR RN

## 2024-05-16 NOTE — TELEPHONE ENCOUNTER
Last Appointment:  3/1/2024  Future Appointments   Date Time Provider Department Center   7/30/2024  8:45 AM Hussain Ortiz Sr., MD [facility]   9/3/2024  8:30 AM Brittaney Braswell MD [facility]

## 2024-05-18 RX ORDER — SERTRALINE HYDROCHLORIDE 100 MG/1
100 TABLET, FILM COATED ORAL DAILY
Qty: 90 TABLET | Refills: 1 | Status: SHIPPED | OUTPATIENT
Start: 2024-05-18

## 2024-05-20 SDOH — HEALTH STABILITY: PHYSICAL HEALTH: ON AVERAGE, HOW MANY DAYS PER WEEK DO YOU ENGAGE IN MODERATE TO STRENUOUS EXERCISE (LIKE A BRISK WALK)?: 5 DAYS

## 2024-05-20 SDOH — HEALTH STABILITY: PHYSICAL HEALTH: ON AVERAGE, HOW MANY MINUTES DO YOU ENGAGE IN EXERCISE AT THIS LEVEL?: 20 MIN

## 2024-05-21 ENCOUNTER — OFFICE VISIT (OUTPATIENT)
Facility: CLINIC | Age: 59
End: 2024-05-21
Payer: COMMERCIAL

## 2024-05-21 VITALS
DIASTOLIC BLOOD PRESSURE: 64 MMHG | HEIGHT: 66 IN | WEIGHT: 156 LBS | SYSTOLIC BLOOD PRESSURE: 100 MMHG | RESPIRATION RATE: 13 BRPM | OXYGEN SATURATION: 99 % | TEMPERATURE: 97.9 F | HEART RATE: 84 BPM | BODY MASS INDEX: 25.07 KG/M2

## 2024-05-21 DIAGNOSIS — M25.50 POLYARTHRALGIA: ICD-10-CM

## 2024-05-21 DIAGNOSIS — E78.00 ELEVATED LDL CHOLESTEROL LEVEL: ICD-10-CM

## 2024-05-21 DIAGNOSIS — K58.0 IRRITABLE BOWEL SYNDROME WITH DIARRHEA: ICD-10-CM

## 2024-05-21 DIAGNOSIS — E55.9 VITAMIN D DEFICIENCY: Primary | ICD-10-CM

## 2024-05-21 DIAGNOSIS — R53.83 OTHER FATIGUE: ICD-10-CM

## 2024-05-21 DIAGNOSIS — D64.9 ANEMIA, UNSPECIFIED TYPE: ICD-10-CM

## 2024-05-21 DIAGNOSIS — F41.1 GAD (GENERALIZED ANXIETY DISORDER): Primary | ICD-10-CM

## 2024-05-21 DIAGNOSIS — R76.8 ANA POSITIVE: ICD-10-CM

## 2024-05-21 DIAGNOSIS — J30.89 NON-SEASONAL ALLERGIC RHINITIS, UNSPECIFIED TRIGGER: ICD-10-CM

## 2024-05-21 PROCEDURE — 99215 OFFICE O/P EST HI 40 MIN: CPT | Performed by: FAMILY MEDICINE

## 2024-05-21 RX ORDER — FLUTICASONE PROPIONATE 50 MCG
2 SPRAY, SUSPENSION (ML) NASAL DAILY
Qty: 16 G | Refills: 5 | Status: SHIPPED | OUTPATIENT
Start: 2024-05-21

## 2024-05-21 RX ORDER — PAROXETINE 10 MG/1
10 TABLET, FILM COATED ORAL DAILY
Qty: 30 TABLET | Refills: 3 | Status: SHIPPED | OUTPATIENT
Start: 2024-05-21

## 2024-05-21 RX ORDER — HYDROXYZINE HYDROCHLORIDE 10 MG/1
10 TABLET, FILM COATED ORAL 2 TIMES DAILY PRN
Qty: 45 TABLET | Refills: 5 | Status: SHIPPED | OUTPATIENT
Start: 2024-05-21

## 2024-05-21 RX ORDER — MONTELUKAST SODIUM 10 MG/1
10 TABLET ORAL DAILY
Qty: 30 TABLET | Refills: 3 | Status: SHIPPED | OUTPATIENT
Start: 2024-05-21

## 2024-05-21 SDOH — ECONOMIC STABILITY: INCOME INSECURITY: HOW HARD IS IT FOR YOU TO PAY FOR THE VERY BASICS LIKE FOOD, HOUSING, MEDICAL CARE, AND HEATING?: NOT HARD AT ALL

## 2024-05-21 SDOH — ECONOMIC STABILITY: FOOD INSECURITY: WITHIN THE PAST 12 MONTHS, THE FOOD YOU BOUGHT JUST DIDN'T LAST AND YOU DIDN'T HAVE MONEY TO GET MORE.: NEVER TRUE

## 2024-05-21 SDOH — ECONOMIC STABILITY: FOOD INSECURITY: WITHIN THE PAST 12 MONTHS, YOU WORRIED THAT YOUR FOOD WOULD RUN OUT BEFORE YOU GOT MONEY TO BUY MORE.: NEVER TRUE

## 2024-05-21 ASSESSMENT — ANXIETY QUESTIONNAIRES
4. TROUBLE RELAXING: MORE THAN HALF THE DAYS
2. NOT BEING ABLE TO STOP OR CONTROL WORRYING: SEVERAL DAYS
GAD7 TOTAL SCORE: 8
3. WORRYING TOO MUCH ABOUT DIFFERENT THINGS: SEVERAL DAYS
IF YOU CHECKED OFF ANY PROBLEMS ON THIS QUESTIONNAIRE, HOW DIFFICULT HAVE THESE PROBLEMS MADE IT FOR YOU TO DO YOUR WORK, TAKE CARE OF THINGS AT HOME, OR GET ALONG WITH OTHER PEOPLE: VERY DIFFICULT
7. FEELING AFRAID AS IF SOMETHING AWFUL MIGHT HAPPEN: MORE THAN HALF THE DAYS
1. FEELING NERVOUS, ANXIOUS, OR ON EDGE: MORE THAN HALF THE DAYS
5. BEING SO RESTLESS THAT IT IS HARD TO SIT STILL: NOT AT ALL
6. BECOMING EASILY ANNOYED OR IRRITABLE: NOT AT ALL

## 2024-05-21 NOTE — PROGRESS NOTES
Jessiac Fernández (:  1965) is a 59 y.o. female,New patient, here for evaluation of the following chief complaint(s):  Establish Care (Patient states that she been having left side chest pain. She has been seeing Dr. Ortiz for it. Patient states that she is having all issues on her left side. Cramps in legs and toes, pain. Patient also complains of stomach bloating, when she eats it comes right out (loose and sticky,brownish). Patient states that she would like a referral for rheumatology due to LILIANE being elevated. Patient is also requesting a referral for gastroenterology. ), Sinus Problem (Patient states that she deal with chronic headaches. ), and Foot Swelling (Patient states that both ankles does swell every night. Patient states that she does elevate her legs at night. )      Assessment & Plan   1. COOPER (generalized anxiety disorder)  -     PARoxetine (PAXIL) 10 MG tablet; Take 1 tablet by mouth daily, Disp-30 tablet, R-3Normal  -     hydrOXYzine HCl (ATARAX) 10 MG tablet; Take 1 tablet by mouth 2 times daily as needed for Itching, Disp-45 tablet, R-5Normal  2. Irritable bowel syndrome with diarrhea  -     External Referral To Gastroenterology  3. Elevated LDL cholesterol level  -     Lipid Panel; Future  -     Comprehensive Metabolic Panel; Future  4. Anemia, unspecified type  -     CBC; Future  -     Vitamin D 25 Hydroxy; Future  5. Non-seasonal allergic rhinitis, unspecified trigger  -     montelukast (SINGULAIR) 10 MG tablet; Take 1 tablet by mouth daily, Disp-30 tablet, R-3Normal  -     fluticasone (FLONASE) 50 MCG/ACT nasal spray; 2 sprays by Each Nostril route daily, Disp-16 g, R-5Normal  6. Other fatigue  -     CBC; Future  -     Vitamin D 25 Hydroxy; Future  7. Polyarthralgia  -     External Referral To Rheumatology  8. LILIANE positive  -     External Referral To Rheumatology      Return in about 4 weeks (around 2024) for anxiety, HLD, anemia, vit D def'cy, allergic rhinitis, IBS, 
  Not being able to stop or control worrying Several days   Worrying too much about different things Several days   Trouble relaxing More than half the days   Being so restless that it is hard to sit still Not at all   Becoming easily annoyed or irritable Not at all   Feeling afraid as if something awful might happen More than half the days   COOPER-7 Total Score 8   How difficult have these problems made it for you to do your work, take care of things at home, or get along with other people? Very difficult        Travel Screening:    Travel Screening       Question Response    Have you been in contact with someone who was sick? No / Unsure    Do you have any of the following new or worsening symptoms? None of these    Have you traveled internationally or domestically in the last month? No          Travel History   Travel since 04/21/24    No documented travel since 04/21/24          Health Maintenance reviewed and discussed and ordered per Provider.  Transportation Needs: Unknown (5/21/2024)    PRAPARE - Transportation     Lack of Transportation (Medical): Not on file     Lack of Transportation (Non-Medical): No      Food Insecurity: No Food Insecurity (5/21/2024)    Hunger Vital Sign     Worried About Running Out of Food in the Last Year: Never true     Ran Out of Food in the Last Year: Never true     Financial Resource Strain: Low Risk  (5/21/2024)    Overall Financial Resource Strain (CARDIA)     Difficulty of Paying Living Expenses: Not hard at all     Housing Stability: Unknown (5/21/2024)    Housing Stability Vital Sign     Unable to Pay for Housing in the Last Year: Not on file     Number of Places Lived in the Last Year: Not on file     Unstable Housing in the Last Year: No       Did you provide resources if patient requested them? Yes patient declined      Health Maintenance Due   Topic Date Due    Hepatitis B vaccine (1 of 3 - 3-dose series) Never done    HIV screen  Never done    Hepatitis C screen  Never

## 2024-05-22 NOTE — TELEPHONE ENCOUNTER
Spoke with patient today.  Patient states that she received the medication that she was looking for from [facility] on Sunday (5/19/24).

## 2024-05-23 ENCOUNTER — TELEPHONE (OUTPATIENT)
Facility: CLINIC | Age: 59
End: 2024-05-23

## 2024-05-23 LAB
25(OH)D3+25(OH)D2 SERPL-MCNC: 23.1 NG/ML (ref 30–100)
ALBUMIN SERPL-MCNC: 4.4 G/DL (ref 3.8–4.9)
ALBUMIN/GLOB SERPL: 2 {RATIO} (ref 1.2–2.2)
ALP SERPL-CCNC: 98 IU/L (ref 44–121)
ALT SERPL-CCNC: 13 IU/L (ref 0–32)
AST SERPL-CCNC: 16 IU/L (ref 0–40)
BILIRUB SERPL-MCNC: 0.3 MG/DL (ref 0–1.2)
BUN SERPL-MCNC: 13 MG/DL (ref 6–24)
BUN/CREAT SERPL: 17 (ref 9–23)
CALCIUM SERPL-MCNC: 9.4 MG/DL (ref 8.7–10.2)
CHLORIDE SERPL-SCNC: 106 MMOL/L (ref 96–106)
CHOLEST SERPL-MCNC: 273 MG/DL (ref 100–199)
CO2 SERPL-SCNC: 21 MMOL/L (ref 20–29)
CREAT SERPL-MCNC: 0.77 MG/DL (ref 0.57–1)
EGFRCR SERPLBLD CKD-EPI 2021: 89 ML/MIN/1.73
ERYTHROCYTE [DISTWIDTH] IN BLOOD BY AUTOMATED COUNT: 12 % (ref 11.7–15.4)
GLOBULIN SER CALC-MCNC: 2.2 G/DL (ref 1.5–4.5)
GLUCOSE SERPL-MCNC: 78 MG/DL (ref 70–99)
HCT VFR BLD AUTO: 33.5 % (ref 34–46.6)
HDLC SERPL-MCNC: 54 MG/DL
HGB BLD-MCNC: 10.9 G/DL (ref 11.1–15.9)
LABORATORY COMMENT REPORT: ABNORMAL
LDLC SERPL CALC-MCNC: 197 MG/DL (ref 0–99)
MCH RBC QN AUTO: 30.8 PG (ref 26.6–33)
MCHC RBC AUTO-ENTMCNC: 32.5 G/DL (ref 31.5–35.7)
MCV RBC AUTO: 95 FL (ref 79–97)
PLATELET # BLD AUTO: 276 X10E3/UL (ref 150–450)
POTASSIUM SERPL-SCNC: 4.7 MMOL/L (ref 3.5–5.2)
PROT SERPL-MCNC: 6.6 G/DL (ref 6–8.5)
RBC # BLD AUTO: 3.54 X10E6/UL (ref 3.77–5.28)
SODIUM SERPL-SCNC: 143 MMOL/L (ref 134–144)
TRIGL SERPL-MCNC: 124 MG/DL (ref 0–149)
VLDLC SERPL CALC-MCNC: 22 MG/DL (ref 5–40)
WBC # BLD AUTO: 4.2 X10E3/UL (ref 3.4–10.8)

## 2024-05-23 RX ORDER — ERGOCALCIFEROL 1.25 MG/1
50000 CAPSULE ORAL
Qty: 24 CAPSULE | Refills: 4 | Status: SHIPPED | OUTPATIENT
Start: 2024-05-23

## 2024-05-28 ASSESSMENT — ENCOUNTER SYMPTOMS
SINUS PRESSURE: 1
VOICE CHANGE: 1
RESPIRATORY NEGATIVE: 1
DIARRHEA: 1
ABDOMINAL DISTENTION: 1
ABDOMINAL PAIN: 1

## 2024-05-30 NOTE — PROGRESS NOTES
Patient states that she been taking Vitamin D since last Friday. She states that she will give it more time but she still feels sluggish. She states that she is not sleeping all night.

## 2024-06-07 ENCOUNTER — TELEPHONE (OUTPATIENT)
Facility: CLINIC | Age: 59
End: 2024-06-07

## 2024-06-07 NOTE — TELEPHONE ENCOUNTER
----- Message from Barbara Fernández sent at 6/5/2024  5:29 PM EDT -----  Regarding: Dr. BOGGS.  Contact: [phone]  hi dr roland,    received a phone call today from tiffani at dr. boggs's office. she is requesting lab results ([email or web address]). she is asking if the results can be faxed to the same number that the referral was faxed to.    thank you,    barbara

## 2024-06-12 DIAGNOSIS — F41.1 GAD (GENERALIZED ANXIETY DISORDER): ICD-10-CM

## 2024-06-12 RX ORDER — PAROXETINE 10 MG/1
10 TABLET, FILM COATED ORAL DAILY
Qty: 90 TABLET | Refills: 2 | OUTPATIENT
Start: 2024-06-12

## 2024-06-12 NOTE — TELEPHONE ENCOUNTER
Last seen 5/21/2024   Last labs   Last filled    Next appointment 6/19/2024     Lab Results   Component Value Date     05/22/2024    K 4.7 05/22/2024     05/22/2024    CO2 21 05/22/2024    BUN 13 05/22/2024    CREATININE 0.77 05/22/2024    GLUCOSE 78 05/22/2024    CALCIUM 9.4 05/22/2024    BILITOT 0.3 05/22/2024    ALKPHOS 98 05/22/2024    AST 16 05/22/2024    ALT 13 05/22/2024    LABGLOM 89 05/22/2024    GFRAA >60 12/16/2021    AGRATIO 2.0 05/22/2024    GLOB 1.9 (L) 04/09/2020

## 2024-06-18 ENCOUNTER — OFFICE VISIT (OUTPATIENT)
Facility: CLINIC | Age: 59
End: 2024-06-18
Payer: COMMERCIAL

## 2024-06-18 VITALS
WEIGHT: 156.4 LBS | SYSTOLIC BLOOD PRESSURE: 103 MMHG | DIASTOLIC BLOOD PRESSURE: 63 MMHG | BODY MASS INDEX: 25.13 KG/M2 | HEART RATE: 82 BPM | HEIGHT: 66 IN | TEMPERATURE: 97.9 F | OXYGEN SATURATION: 99 % | RESPIRATION RATE: 13 BRPM

## 2024-06-18 DIAGNOSIS — D64.9 ANEMIA, UNSPECIFIED TYPE: ICD-10-CM

## 2024-06-18 DIAGNOSIS — E78.00 ELEVATED LDL CHOLESTEROL LEVEL: Primary | ICD-10-CM

## 2024-06-18 DIAGNOSIS — J30.89 NON-SEASONAL ALLERGIC RHINITIS, UNSPECIFIED TRIGGER: ICD-10-CM

## 2024-06-18 DIAGNOSIS — M25.50 POLYARTHRALGIA: ICD-10-CM

## 2024-06-18 DIAGNOSIS — R53.83 OTHER FATIGUE: ICD-10-CM

## 2024-06-18 DIAGNOSIS — K58.0 IRRITABLE BOWEL SYNDROME WITH DIARRHEA: ICD-10-CM

## 2024-06-18 DIAGNOSIS — F41.1 GAD (GENERALIZED ANXIETY DISORDER): ICD-10-CM

## 2024-06-18 PROCEDURE — 99214 OFFICE O/P EST MOD 30 MIN: CPT | Performed by: FAMILY MEDICINE

## 2024-06-18 RX ORDER — ATORVASTATIN CALCIUM 80 MG/1
80 TABLET, FILM COATED ORAL DAILY
Qty: 30 TABLET | Refills: 5 | Status: SHIPPED | OUTPATIENT
Start: 2024-06-18

## 2024-06-18 RX ORDER — PAROXETINE HYDROCHLORIDE 20 MG/1
20 TABLET, FILM COATED ORAL DAILY
Qty: 30 TABLET | Refills: 5 | Status: SHIPPED | OUTPATIENT
Start: 2024-06-18

## 2024-06-18 SDOH — ECONOMIC STABILITY: FOOD INSECURITY: WITHIN THE PAST 12 MONTHS, YOU WORRIED THAT YOUR FOOD WOULD RUN OUT BEFORE YOU GOT MONEY TO BUY MORE.: NEVER TRUE

## 2024-06-18 SDOH — ECONOMIC STABILITY: FOOD INSECURITY: WITHIN THE PAST 12 MONTHS, THE FOOD YOU BOUGHT JUST DIDN'T LAST AND YOU DIDN'T HAVE MONEY TO GET MORE.: NEVER TRUE

## 2024-06-18 SDOH — ECONOMIC STABILITY: INCOME INSECURITY: HOW HARD IS IT FOR YOU TO PAY FOR THE VERY BASICS LIKE FOOD, HOUSING, MEDICAL CARE, AND HEATING?: NOT HARD AT ALL

## 2024-06-18 ASSESSMENT — PATIENT HEALTH QUESTIONNAIRE - PHQ9
SUM OF ALL RESPONSES TO PHQ QUESTIONS 1-9: 7
10. IF YOU CHECKED OFF ANY PROBLEMS, HOW DIFFICULT HAVE THESE PROBLEMS MADE IT FOR YOU TO DO YOUR WORK, TAKE CARE OF THINGS AT HOME, OR GET ALONG WITH OTHER PEOPLE: SOMEWHAT DIFFICULT
6. FEELING BAD ABOUT YOURSELF - OR THAT YOU ARE A FAILURE OR HAVE LET YOURSELF OR YOUR FAMILY DOWN: NOT AT ALL
5. POOR APPETITE OR OVEREATING: MORE THAN HALF THE DAYS
2. FEELING DOWN, DEPRESSED OR HOPELESS: NOT AT ALL
SUM OF ALL RESPONSES TO PHQ QUESTIONS 1-9: 7
7. TROUBLE CONCENTRATING ON THINGS, SUCH AS READING THE NEWSPAPER OR WATCHING TELEVISION: SEVERAL DAYS
SUM OF ALL RESPONSES TO PHQ QUESTIONS 1-9: 7
SUM OF ALL RESPONSES TO PHQ QUESTIONS 1-9: 7
1. LITTLE INTEREST OR PLEASURE IN DOING THINGS: SEVERAL DAYS
3. TROUBLE FALLING OR STAYING ASLEEP: SEVERAL DAYS
4. FEELING TIRED OR HAVING LITTLE ENERGY: MORE THAN HALF THE DAYS
SUM OF ALL RESPONSES TO PHQ9 QUESTIONS 1 & 2: 1
8. MOVING OR SPEAKING SO SLOWLY THAT OTHER PEOPLE COULD HAVE NOTICED. OR THE OPPOSITE, BEING SO FIGETY OR RESTLESS THAT YOU HAVE BEEN MOVING AROUND A LOT MORE THAN USUAL: NOT AT ALL

## 2024-06-18 ASSESSMENT — ENCOUNTER SYMPTOMS: RESPIRATORY NEGATIVE: 1

## 2024-06-18 NOTE — PROGRESS NOTES
ASSESSMENT/PLAN:  1. Elevated LDL cholesterol level  -     atorvastatin (LIPITOR) 80 MG tablet; Take 1 tablet by mouth daily, Disp-30 tablet, R-5Normal  2. COOPER (generalized anxiety disorder)  -     PARoxetine (PAXIL) 20 MG tablet; Take 1 tablet by mouth daily, Disp-30 tablet, R-5Normal  3. Irritable bowel syndrome with diarrhea  Assessment & Plan:   Monitored by specialist- no acute findings meriting change in the plan  4. Anemia, unspecified type  Assessment & Plan:    F/u with hematology pending.   5. Non-seasonal allergic rhinitis, unspecified trigger  Assessment & Plan:    F/u with ENT pending  6. Polyarthralgia  Assessment & Plan:    Await rheum eval  7. Other fatigue  Assessment & Plan:    Likely related to vit d def'cy as well as anemia.  Will address each as noted above.         Return in about 6 weeks (around 7/30/2024) for anxiety, medication change(s), (no labs).      SUBJECTIVE/OBJECTIVE:    Chief Complaint   Patient presents with    Anxiety    Anemia    Allergic Rhinitis     Cholesterol Problem    Irritable Bowel Syndrome    Discuss Labs    Other     Medication change          Anxiety  Symptoms include nervous/anxious behavior.       Anemia    Allergic Rhinitis   She complains of headaches.       Jessica Fernández is a 59 y.o. female presenting today for    4 weeks  follow up of cooper, hld, ibs-d, anemia, AR, and fatigue.    Pt has started the allergy meds.  She feels there is less congestion but she does still have daily ha that is unchanged.  She will see her ENT later this month for f/u.     Pt did see gi on 6/13/24 for eval of possible ibs-d.  She had testing; they will call her regarding results.      Pt has been able to sched an appt with rheum.  She has an appt for Oct 29, 2024.  She can call weekly to check for cancellations.    Pt has had iron transfusions previously with Dr. Fernández of [facility].  She will see him in July for f/u.      Patient had labs on 5/22/24.  Labs reviewed in detail with patient

## 2024-06-18 NOTE — PROGRESS NOTES
Jessica Fernández presents today for   Chief Complaint   Patient presents with    Anxiety    Anemia    Allergic Rhinitis     Cholesterol Problem    Irritable Bowel Syndrome    Discuss Labs    Other     Medication change        Is someone accompanying this pt? No     Is the patient using any DME equipment during OV? no    Depression Screenin/18/2024     8:19 AM 3/1/2024    12:25 PM 3/1/2024    12:24 PM   PHQ-9 Questionaire   Little interest or pleasure in doing things 1 0 0   Feeling down, depressed, or hopeless 0 0 0   Trouble falling or staying asleep, or sleeping too much 1 1    Feeling tired or having little energy 2 3    Poor appetite or overeating 2 0    Feeling bad about yourself - or that you are a failure or have let yourself or your family down 0 0    Trouble concentrating on things, such as reading the newspaper or watching television  0    Moving or speaking so slowly that other people could have noticed. Or the opposite - being so fidgety or restless that you have been moving around a lot more than usual  0    Thoughts that you would be better off dead, or of hurting yourself in some way  0    PHQ-9 Total Score 6 4 0   If you checked off any problems, how difficult have these problems made it for you to do your work, take care of things at home, or get along with other people?  0         COOPER 7-Anxiety       2024     9:35 AM   COOPER-7 SCREENING   Feeling nervous, anxious, or on edge More than half the days   Not being able to stop or control worrying Several days   Worrying too much about different things Several days   Trouble relaxing More than half the days   Being so restless that it is hard to sit still Not at all   Becoming easily annoyed or irritable Not at all   Feeling afraid as if something awful might happen More than half the days   COOPER-7 Total Score 8   How difficult have these problems made it for you to do your work, take care of things at home, or get along with other people?

## 2024-06-20 PROBLEM — R53.83 OTHER FATIGUE: Status: ACTIVE | Noted: 2024-06-20

## 2024-06-20 PROBLEM — M25.50 POLYARTHRALGIA: Status: ACTIVE | Noted: 2024-06-20

## 2024-06-20 PROBLEM — F41.1 GAD (GENERALIZED ANXIETY DISORDER): Status: ACTIVE | Noted: 2024-06-20

## 2024-06-20 PROBLEM — K58.0 IRRITABLE BOWEL SYNDROME WITH DIARRHEA: Status: ACTIVE | Noted: 2024-06-20

## 2024-07-10 DIAGNOSIS — F41.1 GAD (GENERALIZED ANXIETY DISORDER): ICD-10-CM

## 2024-07-11 RX ORDER — PAROXETINE HYDROCHLORIDE 20 MG/1
20 TABLET, FILM COATED ORAL DAILY
Qty: 90 TABLET | Refills: 1 | Status: SHIPPED | OUTPATIENT
Start: 2024-07-11

## 2024-07-11 NOTE — TELEPHONE ENCOUNTER
Last seen 6/18/2024   Last labs   Last filled  6/18/24  Next appointment 7/31/2024     Lab Results   Component Value Date     05/22/2024    K 4.7 05/22/2024     05/22/2024    CO2 21 05/22/2024    BUN 13 05/22/2024    CREATININE 0.77 05/22/2024    GLUCOSE 78 05/22/2024    CALCIUM 9.4 05/22/2024    BILITOT 0.3 05/22/2024    ALKPHOS 98 05/22/2024    AST 16 05/22/2024    ALT 13 05/22/2024    LABGLOM 89 05/22/2024    GFRAA >60 12/16/2021    AGRATIO 2.0 05/22/2024    GLOB 1.9 (L) 04/09/2020      Patient requesting 90 supply.

## 2024-07-29 ENCOUNTER — TELEPHONE (OUTPATIENT)
Age: 59
End: 2024-07-29

## 2024-08-02 ENCOUNTER — OFFICE VISIT (OUTPATIENT)
Facility: CLINIC | Age: 59
End: 2024-08-02
Payer: COMMERCIAL

## 2024-08-02 VITALS
HEART RATE: 77 BPM | RESPIRATION RATE: 13 BRPM | WEIGHT: 151 LBS | TEMPERATURE: 98.1 F | SYSTOLIC BLOOD PRESSURE: 104 MMHG | HEIGHT: 66 IN | DIASTOLIC BLOOD PRESSURE: 65 MMHG | OXYGEN SATURATION: 99 % | BODY MASS INDEX: 24.27 KG/M2

## 2024-08-02 DIAGNOSIS — D64.9 ANEMIA, UNSPECIFIED TYPE: ICD-10-CM

## 2024-08-02 DIAGNOSIS — E78.00 ELEVATED LDL CHOLESTEROL LEVEL: ICD-10-CM

## 2024-08-02 DIAGNOSIS — K58.0 IRRITABLE BOWEL SYNDROME WITH DIARRHEA: ICD-10-CM

## 2024-08-02 DIAGNOSIS — E55.9 VITAMIN D DEFICIENCY: ICD-10-CM

## 2024-08-02 DIAGNOSIS — M25.50 POLYARTHRALGIA: ICD-10-CM

## 2024-08-02 DIAGNOSIS — F41.1 GAD (GENERALIZED ANXIETY DISORDER): Primary | ICD-10-CM

## 2024-08-02 PROCEDURE — 99214 OFFICE O/P EST MOD 30 MIN: CPT | Performed by: FAMILY MEDICINE

## 2024-08-02 RX ORDER — ONDANSETRON 4 MG/1
TABLET, ORALLY DISINTEGRATING ORAL
COMMUNITY
Start: 2024-07-25

## 2024-08-02 RX ORDER — RIFAXIMIN 550 MG/1
TABLET ORAL
COMMUNITY
Start: 2024-07-26

## 2024-08-02 SDOH — ECONOMIC STABILITY: FOOD INSECURITY: WITHIN THE PAST 12 MONTHS, THE FOOD YOU BOUGHT JUST DIDN'T LAST AND YOU DIDN'T HAVE MONEY TO GET MORE.: NEVER TRUE

## 2024-08-02 SDOH — ECONOMIC STABILITY: FOOD INSECURITY: WITHIN THE PAST 12 MONTHS, YOU WORRIED THAT YOUR FOOD WOULD RUN OUT BEFORE YOU GOT MONEY TO BUY MORE.: NEVER TRUE

## 2024-08-02 SDOH — ECONOMIC STABILITY: INCOME INSECURITY: HOW HARD IS IT FOR YOU TO PAY FOR THE VERY BASICS LIKE FOOD, HOUSING, MEDICAL CARE, AND HEATING?: NOT HARD AT ALL

## 2024-08-02 ASSESSMENT — ENCOUNTER SYMPTOMS: RESPIRATORY NEGATIVE: 1

## 2024-08-02 NOTE — PROGRESS NOTES
ASSESSMENT/PLAN:  1. COOPER (generalized anxiety disorder)  Assessment & Plan:    Much improved; cont pres tx plan.    2. Irritable bowel syndrome with diarrhea  Assessment & Plan:   Monitored by specialist- no acute findings meriting change in the plan  3. Polyarthralgia  Assessment & Plan:    Await rheum eval   4. Elevated LDL cholesterol level  -     Comprehensive Metabolic Panel; Future  -     Lipid Panel; Future  5. Vitamin D deficiency  -     Vitamin D 25 Hydroxy; Future  6. Anemia, unspecified type  -     CBC; Future        Return in about 3 months (around 11/2/2024) for anxiety, HLD, vit D def'cy, anemia, lab review, specialist eval.      SUBJECTIVE/OBJECTIVE:    Chief Complaint   Patient presents with    Anxiety    Other     Medication change          Anxiety            Jessica Fernández is a 59 y.o. female presenting today for    6 weeks  follow up of anxiety.  Her paxil was increased to 20mg at her last appt.      Pt had recent f/u with gi.  She has had an abd u/s.  She is sched for egd and colo this month.        Pt had a home sleep study with her oral appliance; she will f/u for those results soon.      Pt's ent says her sinuses are clear and has suggested she see neuro for this.  She plans to ask her sleep doctor if he can evaluate this.     Pt is still waiting for her appt with rheum in Oct 2024.      Patient does not need medication refills today.      New concerns today: none        Review of Systems   Constitutional: Negative.    HENT: Negative.     Respiratory: Negative.     Cardiovascular: Negative.    All other systems reviewed and are negative.      Physical Exam  Vitals and nursing note reviewed.   Constitutional:       General: She is not in acute distress.     Appearance: Normal appearance.   HENT:      Head: Normocephalic and atraumatic.      Right Ear: External ear normal.      Left Ear: External ear normal.      Nose: Nose normal.      Mouth/Throat:      Mouth: Mucous membranes are moist.

## 2024-08-02 NOTE — PROGRESS NOTES
Jessica Fernández presents today for   Chief Complaint   Patient presents with    Anxiety    Other     Medication change        Is someone accompanying this pt? no    Is the patient using any DME equipment during OV? no    Depression Screenin/18/2024     8:19 AM 3/1/2024    12:25 PM 3/1/2024    12:24 PM   PHQ-9 Questionaire   Little interest or pleasure in doing things 1 0 0   Feeling down, depressed, or hopeless 0 0 0   Trouble falling or staying asleep, or sleeping too much 1 1    Feeling tired or having little energy 2 3    Poor appetite or overeating 2 0    Feeling bad about yourself - or that you are a failure or have let yourself or your family down 0 0    Trouble concentrating on things, such as reading the newspaper or watching television 1 0    Moving or speaking so slowly that other people could have noticed. Or the opposite - being so fidgety or restless that you have been moving around a lot more than usual 0 0    Thoughts that you would be better off dead, or of hurting yourself in some way  0    PHQ-9 Total Score 7 4 0   If you checked off any problems, how difficult have these problems made it for you to do your work, take care of things at home, or get along with other people? 1 0         COOPER 7-Anxiety       2024     9:35 AM   COOPER-7 SCREENING   Feeling nervous, anxious, or on edge More than half the days   Not being able to stop or control worrying Several days   Worrying too much about different things Several days   Trouble relaxing More than half the days   Being so restless that it is hard to sit still Not at all   Becoming easily annoyed or irritable Not at all   Feeling afraid as if something awful might happen More than half the days   COOPER-7 Total Score 8   How difficult have these problems made it for you to do your work, take care of things at home, or get along with other people? Very difficult        Travel Screening:    Travel Screening       Question Response    Have you

## 2024-08-16 DIAGNOSIS — J30.89 NON-SEASONAL ALLERGIC RHINITIS, UNSPECIFIED TRIGGER: ICD-10-CM

## 2024-08-16 RX ORDER — MONTELUKAST SODIUM 10 MG/1
10 TABLET ORAL DAILY
Qty: 90 TABLET | Refills: 4 | Status: SHIPPED | OUTPATIENT
Start: 2024-08-16

## 2024-08-16 NOTE — TELEPHONE ENCOUNTER
Last seen 8/2/2024   Last labs   Last filled  5/21/24  Next appointment 11/6/2024     Lab Results   Component Value Date     05/22/2024    K 4.7 05/22/2024     05/22/2024    CO2 21 05/22/2024    BUN 13 05/22/2024    CREATININE 0.77 05/22/2024    GLUCOSE 78 05/22/2024    CALCIUM 9.4 05/22/2024    BILITOT 0.3 05/22/2024    ALKPHOS 98 05/22/2024    AST 16 05/22/2024    ALT 13 05/22/2024    LABGLOM 89 05/22/2024    GFRAA >60 12/16/2021    AGRATIO 2.0 05/22/2024    GLOB 1.9 (L) 04/09/2020

## 2024-10-17 NOTE — PROGRESS NOTES
Jessica Fernández (:  1965) is a 59 y.o. female,Established patient, here for evaluation of the following chief complaint(s):  Mood Swings (Patient states that she is feeling sad more often, does not feel like getting out of bee\d or being around people. She also states that her hands are swollen when she wakes up in the morning. )         Assessment & Plan  COOPER (generalized anxiety disorder)       Orders:    PARoxetine (PAXIL) 40 MG tablet; Take 1 tablet by mouth daily    Bilateral hand swelling    Rheum eval pending         Other fatigue    Pt will have labs drawn today.           Return in about 19 days (around 2024) for anxiety, vit D def'cy, specialist eval, lab review.       Subjective   HPI  Pt here for eval of mood swings.  She notes that she is taking the vit d as rx'ed but for the last few weeks she feels that she has no energy.  She is not sleeping well at night.  She feels blah and is feeling more anxious.      Pt also notes that her hands seem swollen upon awakening overnight.      Pt will see rheum this month.      Review of Systems   Constitutional: Negative.    HENT: Negative.     Respiratory: Negative.     Cardiovascular: Negative.    Musculoskeletal:  Positive for joint swelling.   Psychiatric/Behavioral:  The patient is nervous/anxious.    All other systems reviewed and are negative.         Objective   Physical Exam  Vitals and nursing note reviewed.   Constitutional:       General: She is not in acute distress.     Appearance: Normal appearance.   HENT:      Head: Normocephalic and atraumatic.      Right Ear: External ear normal.      Left Ear: External ear normal.      Nose: Nose normal.      Mouth/Throat:      Mouth: Mucous membranes are moist.   Eyes:      Extraocular Movements: Extraocular movements intact.      Conjunctiva/sclera: Conjunctivae normal.   Cardiovascular:      Rate and Rhythm: Normal rate and regular rhythm.      Heart sounds: No murmur heard.     No friction

## 2024-10-18 ENCOUNTER — HOSPITAL ENCOUNTER (OUTPATIENT)
Facility: HOSPITAL | Age: 59
Setting detail: SPECIMEN
Discharge: HOME | End: 2024-10-21

## 2024-10-18 ENCOUNTER — OFFICE VISIT (OUTPATIENT)
Facility: CLINIC | Age: 59
End: 2024-10-18
Payer: COMMERCIAL

## 2024-10-18 VITALS
OXYGEN SATURATION: 99 % | SYSTOLIC BLOOD PRESSURE: 105 MMHG | HEART RATE: 80 BPM | BODY MASS INDEX: 24.43 KG/M2 | HEIGHT: 66 IN | WEIGHT: 152 LBS | TEMPERATURE: 97.9 F | RESPIRATION RATE: 13 BRPM | DIASTOLIC BLOOD PRESSURE: 68 MMHG

## 2024-10-18 DIAGNOSIS — F41.1 GAD (GENERALIZED ANXIETY DISORDER): ICD-10-CM

## 2024-10-18 DIAGNOSIS — M79.89 BILATERAL HAND SWELLING: Primary | ICD-10-CM

## 2024-10-18 DIAGNOSIS — R53.83 OTHER FATIGUE: ICD-10-CM

## 2024-10-18 LAB — LABCORP SPECIMEN COLLECTION: NORMAL

## 2024-10-18 PROCEDURE — 99214 OFFICE O/P EST MOD 30 MIN: CPT | Performed by: FAMILY MEDICINE

## 2024-10-18 PROCEDURE — 99001 SPECIMEN HANDLING PT-LAB: CPT

## 2024-10-18 RX ORDER — PAROXETINE 40 MG/1
40 TABLET, FILM COATED ORAL DAILY
Qty: 90 TABLET | Refills: 4 | Status: SHIPPED | OUTPATIENT
Start: 2024-10-18

## 2024-10-18 SDOH — ECONOMIC STABILITY: FOOD INSECURITY: WITHIN THE PAST 12 MONTHS, YOU WORRIED THAT YOUR FOOD WOULD RUN OUT BEFORE YOU GOT MONEY TO BUY MORE.: NEVER TRUE

## 2024-10-18 SDOH — ECONOMIC STABILITY: FOOD INSECURITY: WITHIN THE PAST 12 MONTHS, THE FOOD YOU BOUGHT JUST DIDN'T LAST AND YOU DIDN'T HAVE MONEY TO GET MORE.: NEVER TRUE

## 2024-10-18 SDOH — ECONOMIC STABILITY: INCOME INSECURITY: HOW HARD IS IT FOR YOU TO PAY FOR THE VERY BASICS LIKE FOOD, HOUSING, MEDICAL CARE, AND HEATING?: NOT HARD AT ALL

## 2024-10-18 ASSESSMENT — PATIENT HEALTH QUESTIONNAIRE - PHQ9
9. THOUGHTS THAT YOU WOULD BE BETTER OFF DEAD, OR OF HURTING YOURSELF: NOT AT ALL
4. FEELING TIRED OR HAVING LITTLE ENERGY: NEARLY EVERY DAY
8. MOVING OR SPEAKING SO SLOWLY THAT OTHER PEOPLE COULD HAVE NOTICED. OR THE OPPOSITE, BEING SO FIGETY OR RESTLESS THAT YOU HAVE BEEN MOVING AROUND A LOT MORE THAN USUAL: SEVERAL DAYS
10. IF YOU CHECKED OFF ANY PROBLEMS, HOW DIFFICULT HAVE THESE PROBLEMS MADE IT FOR YOU TO DO YOUR WORK, TAKE CARE OF THINGS AT HOME, OR GET ALONG WITH OTHER PEOPLE: SOMEWHAT DIFFICULT
SUM OF ALL RESPONSES TO PHQ QUESTIONS 1-9: 14
SUM OF ALL RESPONSES TO PHQ QUESTIONS 1-9: 14
3. TROUBLE FALLING OR STAYING ASLEEP: MORE THAN HALF THE DAYS
6. FEELING BAD ABOUT YOURSELF - OR THAT YOU ARE A FAILURE OR HAVE LET YOURSELF OR YOUR FAMILY DOWN: MORE THAN HALF THE DAYS
7. TROUBLE CONCENTRATING ON THINGS, SUCH AS READING THE NEWSPAPER OR WATCHING TELEVISION: MORE THAN HALF THE DAYS
2. FEELING DOWN, DEPRESSED OR HOPELESS: SEVERAL DAYS
SUM OF ALL RESPONSES TO PHQ9 QUESTIONS 1 & 2: 2
5. POOR APPETITE OR OVEREATING: MORE THAN HALF THE DAYS
SUM OF ALL RESPONSES TO PHQ QUESTIONS 1-9: 14
1. LITTLE INTEREST OR PLEASURE IN DOING THINGS: SEVERAL DAYS
SUM OF ALL RESPONSES TO PHQ QUESTIONS 1-9: 14

## 2024-10-18 ASSESSMENT — ANXIETY QUESTIONNAIRES
1. FEELING NERVOUS, ANXIOUS, OR ON EDGE: MORE THAN HALF THE DAYS
5. BEING SO RESTLESS THAT IT IS HARD TO SIT STILL: SEVERAL DAYS
4. TROUBLE RELAXING: NEARLY EVERY DAY
6. BECOMING EASILY ANNOYED OR IRRITABLE: SEVERAL DAYS
2. NOT BEING ABLE TO STOP OR CONTROL WORRYING: MORE THAN HALF THE DAYS
IF YOU CHECKED OFF ANY PROBLEMS ON THIS QUESTIONNAIRE, HOW DIFFICULT HAVE THESE PROBLEMS MADE IT FOR YOU TO DO YOUR WORK, TAKE CARE OF THINGS AT HOME, OR GET ALONG WITH OTHER PEOPLE: VERY DIFFICULT
GAD7 TOTAL SCORE: 13
3. WORRYING TOO MUCH ABOUT DIFFERENT THINGS: MORE THAN HALF THE DAYS
7. FEELING AFRAID AS IF SOMETHING AWFUL MIGHT HAPPEN: MORE THAN HALF THE DAYS

## 2024-10-18 ASSESSMENT — ENCOUNTER SYMPTOMS: RESPIRATORY NEGATIVE: 1

## 2024-10-18 ASSESSMENT — SOCIAL DETERMINANTS OF HEALTH (SDOH)
WITHIN THE LAST YEAR, HAVE TO BEEN RAPED OR FORCED TO HAVE ANY KIND OF SEXUAL ACTIVITY BY YOUR PARTNER OR EX-PARTNER?: NO
WITHIN THE LAST YEAR, HAVE YOU BEEN KICKED, HIT, SLAPPED, OR OTHERWISE PHYSICALLY HURT BY YOUR PARTNER OR EX-PARTNER?: NO
WITHIN THE LAST YEAR, HAVE YOU BEEN AFRAID OF YOUR PARTNER OR EX-PARTNER?: NO
WITHIN THE LAST YEAR, HAVE YOU BEEN HUMILIATED OR EMOTIONALLY ABUSED IN OTHER WAYS BY YOUR PARTNER OR EX-PARTNER?: NO

## 2024-10-18 NOTE — PROGRESS NOTES
Jessica Fernández presents today for   Chief Complaint   Patient presents with    Mood Swings     Patient states that she is feeling sad more often, does not feel like getting out of bee\d or being around people. She also states that her hands are swollen when she wakes up in the morning.        Is someone accompanying this pt? no    Is the patient using any DME equipment during OV? no    Depression Screening:      10/18/2024     9:07 AM 6/18/2024     8:19 AM 3/1/2024    12:25 PM 3/1/2024    12:24 PM   PHQ-9 Questionaire   Little interest or pleasure in doing things 1 1 0 0   Feeling down, depressed, or hopeless 1 0 0 0   Trouble falling or staying asleep, or sleeping too much 2 1 1    Feeling tired or having little energy 3 2 3    Poor appetite or overeating 2 2 0    Feeling bad about yourself - or that you are a failure or have let yourself or your family down 2 0 0    Trouble concentrating on things, such as reading the newspaper or watching television 2 1 0    Moving or speaking so slowly that other people could have noticed. Or the opposite - being so fidgety or restless that you have been moving around a lot more than usual 1 0 0    Thoughts that you would be better off dead, or of hurting yourself in some way 0  0    PHQ-9 Total Score 14 7 4 0   If you checked off any problems, how difficult have these problems made it for you to do your work, take care of things at home, or get along with other people? 1 1 0         COOPER 7-Anxiety       10/18/2024     9:12 AM 5/21/2024     9:35 AM   COOPER-7 SCREENING   Feeling nervous, anxious, or on edge More than half the days More than half the days   Not being able to stop or control worrying More than half the days Several days   Worrying too much about different things More than half the days Several days   Trouble relaxing Nearly every day More than half the days   Being so restless that it is hard to sit still Several days Not at all   Becoming easily annoyed or

## 2024-10-19 LAB
25(OH)D3+25(OH)D2 SERPL-MCNC: 61.1 NG/ML (ref 30–100)
ALBUMIN SERPL-MCNC: 4.6 G/DL (ref 3.8–4.9)
ALP SERPL-CCNC: 110 IU/L (ref 44–121)
ALT SERPL-CCNC: 11 IU/L (ref 0–32)
AST SERPL-CCNC: 19 IU/L (ref 0–40)
BILIRUB SERPL-MCNC: 0.3 MG/DL (ref 0–1.2)
BUN SERPL-MCNC: 13 MG/DL (ref 6–24)
BUN/CREAT SERPL: 14 (ref 9–23)
CALCIUM SERPL-MCNC: 9.5 MG/DL (ref 8.7–10.2)
CHLORIDE SERPL-SCNC: 107 MMOL/L (ref 96–106)
CHOLEST SERPL-MCNC: 242 MG/DL (ref 100–199)
CO2 SERPL-SCNC: 23 MMOL/L (ref 20–29)
CREAT SERPL-MCNC: 0.9 MG/DL (ref 0.57–1)
EGFRCR SERPLBLD CKD-EPI 2021: 74 ML/MIN/1.73
ERYTHROCYTE [DISTWIDTH] IN BLOOD BY AUTOMATED COUNT: 12 % (ref 11.7–15.4)
GLOBULIN SER CALC-MCNC: 2.2 G/DL (ref 1.5–4.5)
GLUCOSE SERPL-MCNC: 80 MG/DL (ref 70–99)
HCT VFR BLD AUTO: 35.1 % (ref 34–46.6)
HDLC SERPL-MCNC: 55 MG/DL
HGB BLD-MCNC: 11 G/DL (ref 11.1–15.9)
LDLC SERPL CALC-MCNC: 163 MG/DL (ref 0–99)
MCH RBC QN AUTO: 30.8 PG (ref 26.6–33)
MCHC RBC AUTO-ENTMCNC: 31.3 G/DL (ref 31.5–35.7)
MCV RBC AUTO: 98 FL (ref 79–97)
PLATELET # BLD AUTO: 292 X10E3/UL (ref 150–450)
POTASSIUM SERPL-SCNC: 4.1 MMOL/L (ref 3.5–5.2)
PROT SERPL-MCNC: 6.8 G/DL (ref 6–8.5)
RBC # BLD AUTO: 3.57 X10E6/UL (ref 3.77–5.28)
SODIUM SERPL-SCNC: 145 MMOL/L (ref 134–144)
TRIGL SERPL-MCNC: 134 MG/DL (ref 0–149)
VLDLC SERPL CALC-MCNC: 24 MG/DL (ref 5–40)
WBC # BLD AUTO: 4.7 X10E3/UL (ref 3.4–10.8)

## 2024-11-11 DIAGNOSIS — J30.89 NON-SEASONAL ALLERGIC RHINITIS, UNSPECIFIED TRIGGER: ICD-10-CM

## 2024-11-11 RX ORDER — FLUTICASONE PROPIONATE 50 MCG
2 SPRAY, SUSPENSION (ML) NASAL DAILY
Qty: 3 EACH | Refills: 4 | Status: SHIPPED | OUTPATIENT
Start: 2024-11-11

## 2024-11-11 NOTE — TELEPHONE ENCOUNTER
Last seen 10/18/2024   Last labs   Last filled  5/21/24  Next appointment 11/19/2024     Lab Results   Component Value Date     (H) 10/18/2024    K 4.1 10/18/2024     (H) 10/18/2024    CO2 23 10/18/2024    BUN 13 10/18/2024    CREATININE 0.90 10/18/2024    GLUCOSE 80 10/18/2024    CALCIUM 9.5 10/18/2024    BILITOT 0.3 10/18/2024    ALKPHOS 110 10/18/2024    AST 19 10/18/2024    ALT 11 10/18/2024    LABGLOM 74 10/18/2024    GFRAA >60 12/16/2021    AGRATIO 2.0 05/22/2024    GLOB 1.9 (L) 04/09/2020

## 2024-12-24 DIAGNOSIS — F41.1 GAD (GENERALIZED ANXIETY DISORDER): ICD-10-CM

## 2024-12-26 RX ORDER — PAROXETINE 20 MG/1
20 TABLET, FILM COATED ORAL DAILY
Qty: 90 TABLET | Refills: 1 | OUTPATIENT
Start: 2024-12-26

## 2024-12-26 NOTE — TELEPHONE ENCOUNTER
Labs:   Lab Results   Component Value Date     (H) 10/18/2024    K 4.1 10/18/2024     (H) 10/18/2024    CO2 23 10/18/2024    BUN 13 10/18/2024    CREATININE 0.90 10/18/2024    GLUCOSE 80 10/18/2024    CALCIUM 9.5 10/18/2024    BILITOT 0.3 10/18/2024    ALKPHOS 110 10/18/2024    AST 19 10/18/2024    ALT 11 10/18/2024    LABGLOM 74 10/18/2024    GFRAA >60 12/16/2021    AGRATIO 2.0 05/22/2024    GLOB 1.9 (L) 04/09/2020        Additional Notes:

## 2025-01-12 DIAGNOSIS — F41.1 GAD (GENERALIZED ANXIETY DISORDER): ICD-10-CM

## 2025-01-13 RX ORDER — PAROXETINE 20 MG/1
20 TABLET, FILM COATED ORAL DAILY
Qty: 90 TABLET | Refills: 1 | OUTPATIENT
Start: 2025-01-13

## 2025-01-27 ENCOUNTER — OFFICE VISIT (OUTPATIENT)
Facility: CLINIC | Age: 60
End: 2025-01-27
Payer: COMMERCIAL

## 2025-01-27 VITALS
SYSTOLIC BLOOD PRESSURE: 99 MMHG | TEMPERATURE: 100.1 F | BODY MASS INDEX: 24.53 KG/M2 | OXYGEN SATURATION: 99 % | HEART RATE: 101 BPM | HEIGHT: 66 IN | DIASTOLIC BLOOD PRESSURE: 77 MMHG | RESPIRATION RATE: 16 BRPM

## 2025-01-27 DIAGNOSIS — J11.1 UPPER RESPIRATORY TRACT INFECTION DUE TO INFLUENZA: Primary | ICD-10-CM

## 2025-01-27 DIAGNOSIS — J11.1 UPPER RESPIRATORY TRACT INFECTION DUE TO INFLUENZA: ICD-10-CM

## 2025-01-27 LAB
EXP DATE SOLUTION: NORMAL
EXP DATE SWAB: NORMAL
EXPIRATION DATE: NORMAL
GROUP A STREP ANTIGEN, POC: NEGATIVE
INFLUENZA A ANTIGEN, POC: POSITIVE
INFLUENZA B ANTIGEN, POC: NEGATIVE
LOT NUMBER POC: NORMAL
LOT NUMBER SOLUTION: NORMAL
LOT NUMBER SWAB: NORMAL
SARS-COV-2 RNA, POC: NEGATIVE
VALID INTERNAL CONTROL, POC: 1
VALID INTERNAL CONTROL, POC: 1

## 2025-01-27 PROCEDURE — 87502 INFLUENZA DNA AMP PROBE: CPT

## 2025-01-27 PROCEDURE — 87635 SARS-COV-2 COVID-19 AMP PRB: CPT

## 2025-01-27 PROCEDURE — 87880 STREP A ASSAY W/OPTIC: CPT

## 2025-01-27 PROCEDURE — 99214 OFFICE O/P EST MOD 30 MIN: CPT

## 2025-01-27 RX ORDER — BENZONATATE 100 MG/1
100 CAPSULE ORAL 3 TIMES DAILY PRN
Qty: 30 CAPSULE | Refills: 0 | Status: SHIPPED | OUTPATIENT
Start: 2025-01-27 | End: 2025-02-06

## 2025-01-27 RX ORDER — ALBUTEROL SULFATE 90 UG/1
2 INHALANT RESPIRATORY (INHALATION) EVERY 4 HOURS PRN
Qty: 1 EACH | Refills: 3 | Status: SHIPPED | OUTPATIENT
Start: 2025-01-27

## 2025-01-27 RX ORDER — METHYLPREDNISOLONE 4 MG/1
TABLET ORAL
Qty: 1 KIT | Refills: 0 | Status: SHIPPED | OUTPATIENT
Start: 2025-01-27

## 2025-01-27 RX ORDER — OSELTAMIVIR PHOSPHATE 75 MG/1
75 CAPSULE ORAL 2 TIMES DAILY
Qty: 10 CAPSULE | Refills: 0 | Status: SHIPPED | OUTPATIENT
Start: 2025-01-27 | End: 2025-01-29 | Stop reason: SINTOL

## 2025-01-27 RX ORDER — ONDANSETRON 4 MG/1
4 TABLET, ORALLY DISINTEGRATING ORAL EVERY 8 HOURS PRN
Qty: 21 TABLET | Refills: 0 | Status: SHIPPED | OUTPATIENT
Start: 2025-01-27

## 2025-01-27 ASSESSMENT — ENCOUNTER SYMPTOMS
VOMITING: 1
NAUSEA: 1
SHORTNESS OF BREATH: 0
COUGH: 1
RHINORRHEA: 1

## 2025-01-27 NOTE — PROGRESS NOTES
Chief Complaint   Patient presents with    Cough     Pt states she has no taste it feels like there's coating on my tongue. And my teeth hurt.        Assessment & Plan:     Assessment & Plan  Upper respiratory tract infection due to influenza    Influenza positive . Strep and covid negative. Start tamiflu BID x5 days, tessalon, medrol-dose pack and albuterol inhaler PRN. Advised pt on supportive measures, including, rest and increase hydration. Refilled zofran d/t intermittent nausea. F/u in 7-10 days if symptoms worsen or do not improve.     Orders:    AMB POC RAPID STREP A    AMB POC COVID-19 COV    AMB POC INFLUENZA A  AND B REAL-TIME RT-PCR    oseltamivir (TAMIFLU) 75 MG capsule; Take 1 capsule by mouth 2 times daily for 5 days    methylPREDNISolone (MEDROL, ADELITA,) 4 MG tablet; Take by mouth.  Use according to package instructions    benzonatate (TESSALON) 100 MG capsule; Take 1 capsule by mouth 3 times daily as needed for Cough    albuterol sulfate HFA (VENTOLIN HFA) 108 (90 Base) MCG/ACT inhaler; Inhale 2 puffs into the lungs every 4 hours as needed for Wheezing or Shortness of Breath          Follow-up and Dispositions    Return in 1 week (on 2/3/2025) for If symptoms worsen or do not improve.       Subjective:     HPI    Influenza   5 days ago , onset  Associated symptoms: Headache, congestion Sneezing, Cough, watery eyes, nonproductive,nausea, vomiting (dry heaving) runny/bloody nose, chills, and body aches   Pertinent Negatives:   no other symptoms  Sick contacts:   No  Recent COVID test:   Yes, negative  Recent flu test:   Yes, negative   Treatment:  Flonase, tessalon, tylenol       Review of Systems   Constitutional:  Positive for chills.   HENT:  Positive for congestion, nosebleeds, rhinorrhea and sneezing.    Respiratory:  Positive for cough. Negative for shortness of breath.    Cardiovascular:  Negative for chest pain and leg swelling.   Gastrointestinal:  Positive for nausea and vomiting.

## 2025-01-29 ENCOUNTER — TELEMEDICINE (OUTPATIENT)
Facility: CLINIC | Age: 60
End: 2025-01-29
Payer: COMMERCIAL

## 2025-01-29 DIAGNOSIS — R11.0 NAUSEA: ICD-10-CM

## 2025-01-29 DIAGNOSIS — L30.9 DERMATITIS: ICD-10-CM

## 2025-01-29 DIAGNOSIS — J10.1 INFLUENZA A: Primary | ICD-10-CM

## 2025-01-29 PROCEDURE — 99214 OFFICE O/P EST MOD 30 MIN: CPT | Performed by: FAMILY MEDICINE

## 2025-01-29 SDOH — ECONOMIC STABILITY: FOOD INSECURITY: WITHIN THE PAST 12 MONTHS, YOU WORRIED THAT YOUR FOOD WOULD RUN OUT BEFORE YOU GOT MONEY TO BUY MORE.: NEVER TRUE

## 2025-01-29 SDOH — ECONOMIC STABILITY: INCOME INSECURITY: IN THE LAST 12 MONTHS, WAS THERE A TIME WHEN YOU WERE NOT ABLE TO PAY THE MORTGAGE OR RENT ON TIME?: NO

## 2025-01-29 SDOH — ECONOMIC STABILITY: TRANSPORTATION INSECURITY
IN THE PAST 12 MONTHS, HAS THE LACK OF TRANSPORTATION KEPT YOU FROM MEDICAL APPOINTMENTS OR FROM GETTING MEDICATIONS?: NO

## 2025-01-29 SDOH — ECONOMIC STABILITY: FOOD INSECURITY: WITHIN THE PAST 12 MONTHS, THE FOOD YOU BOUGHT JUST DIDN'T LAST AND YOU DIDN'T HAVE MONEY TO GET MORE.: NEVER TRUE

## 2025-01-29 SDOH — ECONOMIC STABILITY: TRANSPORTATION INSECURITY
IN THE PAST 12 MONTHS, HAS LACK OF TRANSPORTATION KEPT YOU FROM MEETINGS, WORK, OR FROM GETTING THINGS NEEDED FOR DAILY LIVING?: NO

## 2025-01-29 ASSESSMENT — PATIENT HEALTH QUESTIONNAIRE - PHQ9
8. MOVING OR SPEAKING SO SLOWLY THAT OTHER PEOPLE COULD HAVE NOTICED. OR THE OPPOSITE, BEING SO FIGETY OR RESTLESS THAT YOU HAVE BEEN MOVING AROUND A LOT MORE THAN USUAL: NOT AT ALL
3. TROUBLE FALLING OR STAYING ASLEEP: NOT AT ALL
2. FEELING DOWN, DEPRESSED OR HOPELESS: NOT AT ALL
9. THOUGHTS THAT YOU WOULD BE BETTER OFF DEAD, OR OF HURTING YOURSELF: NOT AT ALL
6. FEELING BAD ABOUT YOURSELF - OR THAT YOU ARE A FAILURE OR HAVE LET YOURSELF OR YOUR FAMILY DOWN: NOT AT ALL
4. FEELING TIRED OR HAVING LITTLE ENERGY: MORE THAN HALF THE DAYS
SUM OF ALL RESPONSES TO PHQ QUESTIONS 1-9: 7
SUM OF ALL RESPONSES TO PHQ QUESTIONS 1-9: 7
5. POOR APPETITE OR OVEREATING: NEARLY EVERY DAY
SUM OF ALL RESPONSES TO PHQ9 QUESTIONS 1 & 2: 1
SUM OF ALL RESPONSES TO PHQ QUESTIONS 1-9: 7
SUM OF ALL RESPONSES TO PHQ QUESTIONS 1-9: 7
7. TROUBLE CONCENTRATING ON THINGS, SUCH AS READING THE NEWSPAPER OR WATCHING TELEVISION: SEVERAL DAYS
1. LITTLE INTEREST OR PLEASURE IN DOING THINGS: SEVERAL DAYS

## 2025-01-29 ASSESSMENT — ENCOUNTER SYMPTOMS
RESPIRATORY NEGATIVE: 1
NAUSEA: 1

## 2025-01-29 NOTE — PROGRESS NOTES
Jessica Fernández, was evaluated through a synchronous (real-time) audio-video encounter. The patient (or guardian if applicable) is aware that this is a billable service, which includes applicable co-pays. This Virtual Visit was conducted with patient's (and/or legal guardian's) consent. Patient identification was verified, and a caregiver was present when appropriate.   The patient was located at Home: [address] [place] [id number]  Provider was located at Facility (Appt Dept): [address] [place] [phone]  Confirm you are appropriately licensed, registered, or certified to deliver care in the state where the patient is located as indicated above. If you are not or unsure, please re-schedule the visit: Yes, I confirm.     Jessica Fernández (:  1965) is a Established patient, presenting virtually for evaluation of the following:  Chief Complaint   Patient presents with    Rash     Rash developed around lips after taking Tamiflu.          Below is the assessment and plan developed based on review of pertinent history, physical exam, labs, studies, and medications.     Assessment & Plan  Influenza A   Recommend symptomatic treatment as needed.            Nausea    ok to increase dose of zofran to 8mg q 8 hours prn.          Dermatitis   Etiology unclear.  Possibly related to tamiflu.  Resolved at this time.             Return if symptoms worsen or fail to improve.       Subjective   Rash      Patient contacted via [employer] virtual visit.    Pt presents virtually for eval of rash after taking tamiflu.  Pt was seen at an  on 25; all testing was neg.  She retested at home on 25 and was still neg.  Pt came here and saw CARLOS [facility] on 25 since she was not improving.  Pt was positive to flu A.  She had a rash on her face within 24 hours of starting the tamiflu.   Pt did start the prednisone on 25.  She thinks she has had a medrol dosepak in the past.  She does not think she

## 2025-01-29 NOTE — PROGRESS NOTES
Jessica Fernández presents today for   Chief Complaint   Patient presents with    Rash     Rash developed around lips after taking Tamiflu.          Is the patient in the [facility] ? Yes     Is someone accompanying this pt? no    Is the patient using any DME equipment during OV? no    Depression Screenin/29/2025    10:01 AM 10/18/2024     9:07 AM 2024     8:19 AM 3/1/2024    12:25 PM 3/1/2024    12:24 PM   PHQ-9 Questionaire   Little interest or pleasure in doing things 1 1 1 0 0   Feeling down, depressed, or hopeless 0 1 0 0 0   Trouble falling or staying asleep, or sleeping too much 0 2 1 1    Feeling tired or having little energy 2 3 2 3    Poor appetite or overeating 3 2 2 0    Feeling bad about yourself - or that you are a failure or have let yourself or your family down 0 2 0 0    Trouble concentrating on things, such as reading the newspaper or watching television 1 2 1 0    Moving or speaking so slowly that other people could have noticed. Or the opposite - being so fidgety or restless that you have been moving around a lot more than usual 0 1 0 0    Thoughts that you would be better off dead, or of hurting yourself in some way 0 0  0    PHQ-9 Total Score 7 14 7 4 0   If you checked off any problems, how difficult have these problems made it for you to do your work, take care of things at home, or get along with other people?  1 1 0         COOPER 7-Anxiety       10/18/2024     9:12 AM 2024     9:35 AM   COOPER-7 SCREENING   Feeling nervous, anxious, or on edge More than half the days More than half the days   Not being able to stop or control worrying More than half the days Several days   Worrying too much about different things More than half the days Several days   Trouble relaxing Nearly every day More than half the days   Being so restless that it is hard to sit still Several days Not at all   Becoming easily annoyed or irritable Several days Not at all   Feeling afraid as if

## 2025-01-31 RX ORDER — ONDANSETRON 4 MG/1
TABLET, FILM COATED ORAL
Refills: 0 | OUTPATIENT
Start: 2025-01-31

## 2025-02-01 PROBLEM — R11.0 NAUSEA: Status: ACTIVE | Noted: 2025-02-01

## 2025-02-01 PROBLEM — L30.9 DERMATITIS: Status: ACTIVE | Noted: 2025-02-01

## 2025-02-06 ENCOUNTER — TELEPHONE (OUTPATIENT)
Facility: CLINIC | Age: 60
End: 2025-02-06

## 2025-02-06 NOTE — TELEPHONE ENCOUNTER
Patient called in stating that she had the flu last week but is still experiencing symptoms of headache, nausea and fatigue. She states she doesn't know if she's dehydrated or what.  She wants to know if she should come back in to be seen or how to proceed.

## 2025-02-09 NOTE — PROGRESS NOTES
Jessica Fernández (:  1965) is a 59 y.o. female,Established patient, here for evaluation of the following chief complaint(s):  Nausea, Headache, and Fatigue         Assessment & Plan  Epigastric abdominal pain     Contact gi for further eval.   Orders:    omeprazole (PRILOSEC) 40 MG delayed release capsule; Take 1 capsule by mouth every morning (before breakfast)    Nausea   Tolerating po.    Cont zofran.  Ok to try otc dimenhydrinate (not at the same time) to see if this may be effective.    Contact gi for further eval.        Severe depression (HCC)    unchanged .  Cont present tx plan for now.            Return if symptoms worsen or fail to improve.   Keep 3/21/25 appt.     Subjective   HPI  Pt was sick with the flu for a couple of weeks but improved enough to return to work.  She felt good the first 2 days.  Afte that, she started to feel nauseous again.  She is taking the zofran.  It helps but the sx come back when it wears off.  She is feeling weak also.  She went to an IV hydration clinic but her bp was low so they turned her away.  At this point she states \"I just feel bad.\" She was recommended to try an electrolyte supplement and she has taken it in a bottle of water daily for the last 3 days.  Pt has been eating in spite of the nausea but admits she does not eat much.  She tends to eat small, frequent meals.  She is getting a reasonable amount of po fluids also.      Review of Systems   Constitutional: Negative.    HENT: Negative.     Respiratory: Negative.     Cardiovascular: Negative.    Gastrointestinal:  Positive for nausea.   All other systems reviewed and are negative.         Objective   Physical Exam  Vitals and nursing note reviewed.   Constitutional:       General: She is not in acute distress.     Appearance: Normal appearance.   HENT:      Head: Normocephalic and atraumatic.      Right Ear: External ear normal.      Left Ear: External ear normal.      Nose: Nose normal.

## 2025-02-10 ENCOUNTER — OFFICE VISIT (OUTPATIENT)
Facility: CLINIC | Age: 60
End: 2025-02-10
Payer: COMMERCIAL

## 2025-02-10 VITALS
TEMPERATURE: 97.9 F | BODY MASS INDEX: 23.3 KG/M2 | OXYGEN SATURATION: 93 % | WEIGHT: 145 LBS | SYSTOLIC BLOOD PRESSURE: 96 MMHG | HEIGHT: 66 IN | RESPIRATION RATE: 13 BRPM | HEART RATE: 94 BPM | DIASTOLIC BLOOD PRESSURE: 67 MMHG

## 2025-02-10 DIAGNOSIS — F32.2 SEVERE DEPRESSION (HCC): ICD-10-CM

## 2025-02-10 DIAGNOSIS — E78.00 HYPERCHOLESTEREMIA: ICD-10-CM

## 2025-02-10 DIAGNOSIS — R06.02 EXERTIONAL SHORTNESS OF BREATH: Primary | ICD-10-CM

## 2025-02-10 DIAGNOSIS — R10.13 EPIGASTRIC ABDOMINAL PAIN: Primary | ICD-10-CM

## 2025-02-10 DIAGNOSIS — M25.59 PAIN IN OTHER JOINT: ICD-10-CM

## 2025-02-10 DIAGNOSIS — I20.89 ATYPICAL ANGINA (HCC): ICD-10-CM

## 2025-02-10 DIAGNOSIS — R11.0 NAUSEA: ICD-10-CM

## 2025-02-10 PROCEDURE — 99214 OFFICE O/P EST MOD 30 MIN: CPT | Performed by: FAMILY MEDICINE

## 2025-02-10 RX ORDER — OMEPRAZOLE 40 MG/1
40 CAPSULE, DELAYED RELEASE ORAL
Qty: 30 CAPSULE | Refills: 5 | Status: SHIPPED | OUTPATIENT
Start: 2025-02-10

## 2025-02-10 SDOH — ECONOMIC STABILITY: FOOD INSECURITY: WITHIN THE PAST 12 MONTHS, THE FOOD YOU BOUGHT JUST DIDN'T LAST AND YOU DIDN'T HAVE MONEY TO GET MORE.: NEVER TRUE

## 2025-02-10 SDOH — ECONOMIC STABILITY: FOOD INSECURITY: WITHIN THE PAST 12 MONTHS, YOU WORRIED THAT YOUR FOOD WOULD RUN OUT BEFORE YOU GOT MONEY TO BUY MORE.: NEVER TRUE

## 2025-02-10 ASSESSMENT — PATIENT HEALTH QUESTIONNAIRE - PHQ9
3. TROUBLE FALLING OR STAYING ASLEEP: SEVERAL DAYS
4. FEELING TIRED OR HAVING LITTLE ENERGY: MORE THAN HALF THE DAYS
SUM OF ALL RESPONSES TO PHQ9 QUESTIONS 1 & 2: 1
SUM OF ALL RESPONSES TO PHQ QUESTIONS 1-9: 6
10. IF YOU CHECKED OFF ANY PROBLEMS, HOW DIFFICULT HAVE THESE PROBLEMS MADE IT FOR YOU TO DO YOUR WORK, TAKE CARE OF THINGS AT HOME, OR GET ALONG WITH OTHER PEOPLE: SOMEWHAT DIFFICULT
5. POOR APPETITE OR OVEREATING: SEVERAL DAYS
8. MOVING OR SPEAKING SO SLOWLY THAT OTHER PEOPLE COULD HAVE NOTICED. OR THE OPPOSITE, BEING SO FIGETY OR RESTLESS THAT YOU HAVE BEEN MOVING AROUND A LOT MORE THAN USUAL: NOT AT ALL
2. FEELING DOWN, DEPRESSED OR HOPELESS: NOT AT ALL
9. THOUGHTS THAT YOU WOULD BE BETTER OFF DEAD, OR OF HURTING YOURSELF: NOT AT ALL
SUM OF ALL RESPONSES TO PHQ QUESTIONS 1-9: 6
1. LITTLE INTEREST OR PLEASURE IN DOING THINGS: SEVERAL DAYS
7. TROUBLE CONCENTRATING ON THINGS, SUCH AS READING THE NEWSPAPER OR WATCHING TELEVISION: SEVERAL DAYS
6. FEELING BAD ABOUT YOURSELF - OR THAT YOU ARE A FAILURE OR HAVE LET YOURSELF OR YOUR FAMILY DOWN: NOT AT ALL

## 2025-02-10 NOTE — ASSESSMENT & PLAN NOTE
Problem: Safety - Adult  Goal: Free from fall injury  9/25/2024 2143 by Kellen Arriaga RN  Outcome: Progressing  9/25/2024 1050 by Susie Greenwood RN  Outcome: Progressing  9/25/2024 1050 by Susie Greenwood RN  Outcome: Progressing     Problem: Skin/Tissue Integrity  Goal: Absence of new skin breakdown  Description: 1.  Monitor for areas of redness and/or skin breakdown  2.  Assess vascular access sites hourly  3.  Every 4-6 hours minimum:  Change oxygen saturation probe site  4.  Every 4-6 hours:  If on nasal continuous positive airway pressure, respiratory therapy assess nares and determine need for appliance change or resting period.  9/25/2024 2143 by Kellen Arriaga RN  Outcome: Progressing  9/25/2024 1050 by Susie Greenwood RN  Outcome: Progressing  9/25/2024 1050 by Susie Greenwood RN  Outcome: Progressing     Problem: ABCDS Injury Assessment  Goal: Absence of physical injury  9/25/2024 2143 by Kellen Arriaga RN  Outcome: Progressing  9/25/2024 1050 by Susie Greenwood RN  Outcome: Progressing  9/25/2024 1050 by Susie Greenwood RN  Outcome: Progressing     Problem: Pain  Goal: Verbalizes/displays adequate comfort level or baseline comfort level  9/25/2024 2143 by Kellen Arriaga RN  Outcome: Progressing  9/25/2024 1050 by Susie Greenwood RN  Outcome: Progressing  9/25/2024 1050 by Susie Greenwood RN  Outcome: Progressing       Tolerating po.    Cont zofran.  Ok to try otc dimenhydrinate (not at the same time) to see if this may be effective.    Contact gi for further eval.

## 2025-02-10 NOTE — PROGRESS NOTES
Jessica Fernández presents today for   Chief Complaint   Patient presents with    Nausea    Headache    Fatigue       Is someone accompanying this pt? no    Is the patient using any DME equipment during OV? no    Depression Screenin/29/2025    10:01 AM 10/18/2024     9:07 AM 2024     8:19 AM 3/1/2024    12:25 PM 3/1/2024    12:24 PM   PHQ-9 Questionaire   Little interest or pleasure in doing things 1 1 1 0 0   Feeling down, depressed, or hopeless 0 1 0 0 0   Trouble falling or staying asleep, or sleeping too much 0 2 1 1    Feeling tired or having little energy 2 3 2 3    Poor appetite or overeating 3 2 2 0    Feeling bad about yourself - or that you are a failure or have let yourself or your family down 0 2 0 0    Trouble concentrating on things, such as reading the newspaper or watching television 1 2 1 0    Moving or speaking so slowly that other people could have noticed. Or the opposite - being so fidgety or restless that you have been moving around a lot more than usual 0 1 0 0    Thoughts that you would be better off dead, or of hurting yourself in some way 0 0  0    PHQ-9 Total Score 7 14 7 4 0   If you checked off any problems, how difficult have these problems made it for you to do your work, take care of things at home, or get along with other people?  1 1 0         COOPER 7-Anxiety       10/18/2024     9:12 AM 2024     9:35 AM   COOPER-7 SCREENING   Feeling nervous, anxious, or on edge More than half the days More than half the days   Not being able to stop or control worrying More than half the days Several days   Worrying too much about different things More than half the days Several days   Trouble relaxing Nearly every day More than half the days   Being so restless that it is hard to sit still Several days Not at all   Becoming easily annoyed or irritable Several days Not at all   Feeling afraid as if something awful might happen More than half the days More than half the days   COOPER-7

## 2025-02-12 ENCOUNTER — OFFICE VISIT (OUTPATIENT)
Age: 60
End: 2025-02-12
Payer: COMMERCIAL

## 2025-02-12 VITALS
HEART RATE: 88 BPM | BODY MASS INDEX: 23.3 KG/M2 | TEMPERATURE: 97 F | OXYGEN SATURATION: 98 % | WEIGHT: 145 LBS | DIASTOLIC BLOOD PRESSURE: 71 MMHG | SYSTOLIC BLOOD PRESSURE: 114 MMHG | HEIGHT: 66 IN

## 2025-02-12 DIAGNOSIS — R06.02 EXERTIONAL SHORTNESS OF BREATH: ICD-10-CM

## 2025-02-12 DIAGNOSIS — M25.552 PAIN OF BOTH HIP JOINTS: ICD-10-CM

## 2025-02-12 DIAGNOSIS — I20.89 ATYPICAL ANGINA (HCC): Primary | ICD-10-CM

## 2025-02-12 DIAGNOSIS — M79.10 MUSCULAR PAIN: ICD-10-CM

## 2025-02-12 DIAGNOSIS — R94.31 ABNORMAL ECG: ICD-10-CM

## 2025-02-12 DIAGNOSIS — R05.3 CHRONIC COUGH: ICD-10-CM

## 2025-02-12 DIAGNOSIS — E78.00 HYPERCHOLESTEREMIA: ICD-10-CM

## 2025-02-12 DIAGNOSIS — M25.551 PAIN OF BOTH HIP JOINTS: ICD-10-CM

## 2025-02-12 PROCEDURE — 93000 ELECTROCARDIOGRAM COMPLETE: CPT | Performed by: INTERNAL MEDICINE

## 2025-02-12 PROCEDURE — 99214 OFFICE O/P EST MOD 30 MIN: CPT | Performed by: INTERNAL MEDICINE

## 2025-02-12 ASSESSMENT — PATIENT HEALTH QUESTIONNAIRE - PHQ9
SUM OF ALL RESPONSES TO PHQ QUESTIONS 1-9: 0
1. LITTLE INTEREST OR PLEASURE IN DOING THINGS: NOT AT ALL
SUM OF ALL RESPONSES TO PHQ9 QUESTIONS 1 & 2: 0
SUM OF ALL RESPONSES TO PHQ QUESTIONS 1-9: 0
SUM OF ALL RESPONSES TO PHQ QUESTIONS 1-9: 0
2. FEELING DOWN, DEPRESSED OR HOPELESS: NOT AT ALL
SUM OF ALL RESPONSES TO PHQ QUESTIONS 1-9: 0

## 2025-02-12 ASSESSMENT — ENCOUNTER SYMPTOMS
ALLERGIC/IMMUNOLOGIC NEGATIVE: 1
GASTROINTESTINAL NEGATIVE: 1
EYES NEGATIVE: 1
SHORTNESS OF BREATH: 1

## 2025-02-12 NOTE — PROGRESS NOTES
1. \"Have you been to the ER, urgent care clinic since your last visit?  Hospitalized since your last visit?\" Reviewed by Dr. Hussain Ortiz     2. \"Have you seen or consulted any other health care providers outside of the [facility] since your last visit?\" Reviewed by Dr. Hussain Ortiz    
the visit.    The patient (or guardian, if applicable) and other individuals in attendance with the patient were advised that Artificial Intelligence will be utilized during this visit to record, process the conversation to generate a clinical note and to support improvement of the AI technology. The patient (or guardian, if applicable) and other individuals in attendance at the appointment consented to the use of AI, including the recording.       An electronic signature was used to authenticate this note.    --Hussain Ortiz MD

## 2025-02-13 LAB
ALBUMIN SERPL-MCNC: 4.3 G/DL (ref 3.8–4.9)
ALP SERPL-CCNC: 98 IU/L (ref 44–121)
ALT SERPL-CCNC: 11 IU/L (ref 0–32)
AST SERPL-CCNC: 15 IU/L (ref 0–40)
BILIRUB SERPL-MCNC: 0.3 MG/DL (ref 0–1.2)
BUN SERPL-MCNC: 13 MG/DL (ref 6–24)
BUN/CREAT SERPL: 16 (ref 9–23)
CALCIUM SERPL-MCNC: 9.5 MG/DL (ref 8.7–10.2)
CHLORIDE SERPL-SCNC: 105 MMOL/L (ref 96–106)
CHOLEST SERPL-MCNC: 308 MG/DL (ref 100–199)
CO2 SERPL-SCNC: 21 MMOL/L (ref 20–29)
CREAT SERPL-MCNC: 0.82 MG/DL (ref 0.57–1)
CRP SERPL-MCNC: 9 MG/L (ref 0–10)
D DIMER PPP FEU-MCNC: 0.58 MG/L FEU (ref 0–0.49)
EGFRCR SERPLBLD CKD-EPI 2021: 82 ML/MIN/1.73
ERYTHROCYTE [DISTWIDTH] IN BLOOD BY AUTOMATED COUNT: 12.1 % (ref 11.7–15.4)
ERYTHROCYTE [SEDIMENTATION RATE] IN BLOOD BY WESTERGREN METHOD: 37 MM/HR (ref 0–40)
GLOBULIN SER CALC-MCNC: 2.6 G/DL (ref 1.5–4.5)
GLUCOSE SERPL-MCNC: 80 MG/DL (ref 70–99)
HCT VFR BLD AUTO: 32.1 % (ref 34–46.6)
HDLC SERPL-MCNC: 50 MG/DL
HGB BLD-MCNC: 10.3 G/DL (ref 11.1–15.9)
LDLC SERPL CALC-MCNC: 236 MG/DL (ref 0–99)
Lab: ABNORMAL
MCH RBC QN AUTO: 30.8 PG (ref 26.6–33)
MCHC RBC AUTO-ENTMCNC: 32.1 G/DL (ref 31.5–35.7)
MCV RBC AUTO: 96 FL (ref 79–97)
PLATELET # BLD AUTO: 341 X10E3/UL (ref 150–450)
POTASSIUM SERPL-SCNC: 4.3 MMOL/L (ref 3.5–5.2)
PROT SERPL-MCNC: 6.9 G/DL (ref 6–8.5)
RBC # BLD AUTO: 3.34 X10E6/UL (ref 3.77–5.28)
SODIUM SERPL-SCNC: 144 MMOL/L (ref 134–144)
SPECIMEN STATUS REPORT: NORMAL
T4 FREE SERPL-MCNC: 1.13 NG/DL (ref 0.82–1.77)
TRIGL SERPL-MCNC: 122 MG/DL (ref 0–149)
TROPONIN T SERPL HS-MCNC: <6 NG/L (ref 0–14)
TSH SERPL DL<=0.005 MIU/L-ACNC: 2.12 UIU/ML (ref 0.45–4.5)
VLDLC SERPL CALC-MCNC: 22 MG/DL (ref 5–40)
WBC # BLD AUTO: 4.7 X10E3/UL (ref 3.4–10.8)

## 2025-02-13 ASSESSMENT — ENCOUNTER SYMPTOMS
NAUSEA: 1
RESPIRATORY NEGATIVE: 1

## 2025-02-17 ENCOUNTER — CLINICAL DOCUMENTATION (OUTPATIENT)
Facility: CLINIC | Age: 60
End: 2025-02-17

## 2025-02-17 LAB — NT-PROBNP SERPL-MCNC: 47 PG/ML (ref 0–287)

## 2025-03-03 PROBLEM — J10.1 INFLUENZA A: Status: RESOLVED | Noted: 2025-01-29 | Resolved: 2025-03-03

## 2025-03-18 DIAGNOSIS — E78.00 HYPERCHOLESTEREMIA: Primary | ICD-10-CM

## 2025-03-18 RX ORDER — ROSUVASTATIN CALCIUM 20 MG/1
20 TABLET, COATED ORAL DAILY
Qty: 30 TABLET | Refills: 11 | Status: SHIPPED | OUTPATIENT
Start: 2025-03-18

## (undated) DEVICE — Device: Brand: JELCO

## (undated) DEVICE — KIT CLN UP BON SECOURS MARYV

## (undated) DEVICE — APPLICATOR MEDICATED 26 CC SOLUTION HI LT ORNG CHLORAPREP

## (undated) DEVICE — DRAPE,EXTREMITY,89X128,STERILE: Brand: MEDLINE

## (undated) DEVICE — ELECTRODE PT RET AD L9FT HI MOIST COND ADH HYDRGEL CORDED

## (undated) DEVICE — INTENDED FOR TISSUE SEPARATION, AND OTHER PROCEDURES THAT REQUIRE A SHARP SURGICAL BLADE TO PUNCTURE OR CUT.: Brand: BARD-PARKER SAFETY BLADES SIZE 15, STERILE

## (undated) DEVICE — BLADE CLIPPER GEN PURP NS

## (undated) DEVICE — CURITY NON-ADHERENT STRIPS: Brand: CURITY

## (undated) DEVICE — BLADE SAW W9XL25MM THK0.38MM CUT THK0.43MM REPL SAG OSC THN

## (undated) DEVICE — PACK PROCEDURE SURG MAJ W/ BASIN LF

## (undated) DEVICE — DRAPE C ARM UNIV W41XL74IN CLR PLAS XR VELC CLSR POLY STRP

## (undated) DEVICE — GAUZE,SPONGE,4"X4",16PLY,STRL,LF,10/TRAY: Brand: MEDLINE

## (undated) DEVICE — STRIP SKIN CLSR W0.25XL4IN WHT SPUNBOUND FBR NYL HI ADH

## (undated) DEVICE — BANDAGE,GAUZE,BULKEE LITE,3"X4.1YD,STRL: Brand: MEDLINE

## (undated) DEVICE — SUTURE VCRL SZ 2-0 L27IN ABSRB UD L26MM SH 1/2 CIR J417H

## (undated) DEVICE — BANDAGE,ELASTIC,ESMARK,STERILE,4"X9',LF: Brand: MEDLINE

## (undated) DEVICE — SUTURE MCRYL SZ 4-0 L18IN ABSRB UD P-3 L13MM 3/8 CIR PRIM Y494G

## (undated) DEVICE — BLADE SAW 40X11X0.38 MM REPL 8.7 CM TEETH LAPIPLASTY

## (undated) DEVICE — STERILE LATEX POWDER-FREE SURGICAL GLOVESWITH NITRILE COATING: Brand: PROTEXIS

## (undated) DEVICE — POWDER SURG CELLERATE RX 1 GM HYDROL COLLEGEN

## (undated) DEVICE — SYRINGE MED 10ML LUERLOCK TIP W/O SFTY DISP

## (undated) DEVICE — SUTURE VCRL SZ 3-0 L27IN ABSRB UD L26MM SH 1/2 CIR J416H

## (undated) DEVICE — SOLUTION IV 1000ML 0.9% SOD CHL

## (undated) DEVICE — 1010 S-DRAPE TOWEL DRAPE 10/BX: Brand: STERI-DRAPE™